# Patient Record
Sex: MALE | Race: OTHER | HISPANIC OR LATINO | URBAN - METROPOLITAN AREA
[De-identification: names, ages, dates, MRNs, and addresses within clinical notes are randomized per-mention and may not be internally consistent; named-entity substitution may affect disease eponyms.]

---

## 2018-08-11 ENCOUNTER — EMERGENCY (EMERGENCY)
Facility: HOSPITAL | Age: 48
LOS: 1 days | Discharge: HOME | End: 2018-08-11
Attending: EMERGENCY MEDICINE | Admitting: EMERGENCY MEDICINE
Payer: MEDICAID

## 2018-08-11 VITALS
DIASTOLIC BLOOD PRESSURE: 93 MMHG | HEART RATE: 63 BPM | RESPIRATION RATE: 16 BRPM | WEIGHT: 184.97 LBS | OXYGEN SATURATION: 98 % | TEMPERATURE: 98 F | SYSTOLIC BLOOD PRESSURE: 125 MMHG

## 2018-08-11 DIAGNOSIS — R10.9 UNSPECIFIED ABDOMINAL PAIN: ICD-10-CM

## 2018-08-11 LAB
ANION GAP SERPL CALC-SCNC: 5 MMOL/L — SIGNIFICANT CHANGE UP (ref 5–17)
APPEARANCE UR: CLEAR — SIGNIFICANT CHANGE UP
BASOPHILS # BLD AUTO: 0.1 K/UL — SIGNIFICANT CHANGE UP (ref 0–0.2)
BASOPHILS NFR BLD AUTO: 1.1 % — SIGNIFICANT CHANGE UP (ref 0–2)
BILIRUB UR-MCNC: NEGATIVE — SIGNIFICANT CHANGE UP
BUN SERPL-MCNC: 21 MG/DL — SIGNIFICANT CHANGE UP (ref 7–23)
CALCIUM SERPL-MCNC: 9.1 MG/DL — SIGNIFICANT CHANGE UP (ref 8.4–10.5)
CHLORIDE SERPL-SCNC: 105 MMOL/L — SIGNIFICANT CHANGE UP (ref 96–108)
CO2 SERPL-SCNC: 27 MMOL/L — SIGNIFICANT CHANGE UP (ref 22–31)
COLOR SPEC: YELLOW — SIGNIFICANT CHANGE UP
CREAT SERPL-MCNC: 1.18 MG/DL — SIGNIFICANT CHANGE UP (ref 0.5–1.3)
DIFF PNL FLD: NEGATIVE — SIGNIFICANT CHANGE UP
EOSINOPHIL # BLD AUTO: 0.1 K/UL — SIGNIFICANT CHANGE UP (ref 0–0.5)
EOSINOPHIL NFR BLD AUTO: 1.2 % — SIGNIFICANT CHANGE UP (ref 0–6)
GLUCOSE SERPL-MCNC: 99 MG/DL — SIGNIFICANT CHANGE UP (ref 70–99)
GLUCOSE UR QL: NEGATIVE — SIGNIFICANT CHANGE UP
HCT VFR BLD CALC: 48.1 % — SIGNIFICANT CHANGE UP (ref 39–50)
HGB BLD-MCNC: 16.2 G/DL — SIGNIFICANT CHANGE UP (ref 13–17)
KETONES UR-MCNC: NEGATIVE — SIGNIFICANT CHANGE UP
LEUKOCYTE ESTERASE UR-ACNC: NEGATIVE — SIGNIFICANT CHANGE UP
LYMPHOCYTES # BLD AUTO: 2.4 K/UL — SIGNIFICANT CHANGE UP (ref 1–3.3)
LYMPHOCYTES # BLD AUTO: 36.3 % — SIGNIFICANT CHANGE UP (ref 13–44)
MCHC RBC-ENTMCNC: 30.5 PG — SIGNIFICANT CHANGE UP (ref 27–34)
MCHC RBC-ENTMCNC: 33.8 GM/DL — SIGNIFICANT CHANGE UP (ref 32–36)
MCV RBC AUTO: 90.2 FL — SIGNIFICANT CHANGE UP (ref 80–100)
MONOCYTES # BLD AUTO: 0.5 K/UL — SIGNIFICANT CHANGE UP (ref 0–0.9)
MONOCYTES NFR BLD AUTO: 8.2 % — SIGNIFICANT CHANGE UP (ref 1–9)
NEUTROPHILS # BLD AUTO: 3.6 K/UL — SIGNIFICANT CHANGE UP (ref 1.8–7.4)
NEUTROPHILS NFR BLD AUTO: 53.2 % — SIGNIFICANT CHANGE UP (ref 43–77)
NITRITE UR-MCNC: NEGATIVE — SIGNIFICANT CHANGE UP
PH UR: 6 — SIGNIFICANT CHANGE UP (ref 5–8)
PLATELET # BLD AUTO: 162 K/UL — SIGNIFICANT CHANGE UP (ref 150–400)
POTASSIUM SERPL-MCNC: 4.2 MMOL/L — SIGNIFICANT CHANGE UP (ref 3.5–5.3)
POTASSIUM SERPL-SCNC: 4.2 MMOL/L — SIGNIFICANT CHANGE UP (ref 3.5–5.3)
PROT UR-MCNC: 15
RBC # BLD: 5.33 M/UL — SIGNIFICANT CHANGE UP (ref 4.2–5.8)
RBC # FLD: 11.7 % — SIGNIFICANT CHANGE UP (ref 10.3–14.5)
SODIUM SERPL-SCNC: 137 MMOL/L — SIGNIFICANT CHANGE UP (ref 135–145)
SP GR SPEC: 1.02 — SIGNIFICANT CHANGE UP (ref 1.01–1.02)
UROBILINOGEN FLD QL: NEGATIVE — SIGNIFICANT CHANGE UP
WBC # BLD: 6.7 K/UL — SIGNIFICANT CHANGE UP (ref 3.8–10.5)
WBC # FLD AUTO: 6.7 K/UL — SIGNIFICANT CHANGE UP (ref 3.8–10.5)

## 2018-08-11 PROCEDURE — 99284 EMERGENCY DEPT VISIT MOD MDM: CPT | Mod: 25

## 2018-08-11 NOTE — ED ADULT NURSE NOTE - NSIMPLEMENTINTERV_GEN_ALL_ED
Implemented All Universal Safety Interventions:  [place] to call system. Call bell, personal items and telephone within reach. Instruct patient to call for assistance. Room bathroom lighting operational. Non-slip footwear when patient is off stretcher. Physically safe environment: no spills, clutter or unnecessary equipment. Stretcher in lowest position, wheels locked, appropriate side rails in place.

## 2018-08-12 VITALS
OXYGEN SATURATION: 97 % | SYSTOLIC BLOOD PRESSURE: 112 MMHG | HEART RATE: 72 BPM | RESPIRATION RATE: 16 BRPM | DIASTOLIC BLOOD PRESSURE: 75 MMHG

## 2018-08-12 LAB — COMMENT - URINE: SIGNIFICANT CHANGE UP

## 2018-08-12 PROCEDURE — 74176 CT ABD & PELVIS W/O CONTRAST: CPT | Mod: 26

## 2018-08-12 PROCEDURE — 80048 BASIC METABOLIC PNL TOTAL CA: CPT

## 2018-08-12 PROCEDURE — 74176 CT ABD & PELVIS W/O CONTRAST: CPT

## 2018-08-12 PROCEDURE — 87086 URINE CULTURE/COLONY COUNT: CPT

## 2018-08-12 PROCEDURE — 85027 COMPLETE CBC AUTOMATED: CPT

## 2018-08-12 PROCEDURE — 87591 N.GONORRHOEAE DNA AMP PROB: CPT

## 2018-08-12 PROCEDURE — 87491 CHLMYD TRACH DNA AMP PROBE: CPT

## 2018-08-12 PROCEDURE — 99284 EMERGENCY DEPT VISIT MOD MDM: CPT

## 2018-08-12 PROCEDURE — 81001 URINALYSIS AUTO W/SCOPE: CPT

## 2018-08-12 RX ORDER — CIPROFLOXACIN LACTATE 400MG/40ML
1 VIAL (ML) INTRAVENOUS
Qty: 7 | Refills: 0
Start: 2018-08-12 | End: 2018-08-18

## 2018-08-12 NOTE — ED PROVIDER NOTE - OBJECTIVE STATEMENT
47 year old M complaining of flank pain with urinary frequency and malaise.  Pt denies fever or chills.

## 2018-08-12 NOTE — ED PROVIDER NOTE - MEDICAL DECISION MAKING DETAILS
47 year old M with rt flank pain and urinary frequency, pt has a tender prostrate and will be started on levaquin

## 2018-08-13 LAB
C TRACH RRNA SPEC QL NAA+PROBE: SIGNIFICANT CHANGE UP
CULTURE RESULTS: SIGNIFICANT CHANGE UP
N GONORRHOEA RRNA SPEC QL NAA+PROBE: SIGNIFICANT CHANGE UP
SPECIMEN SOURCE: SIGNIFICANT CHANGE UP
SPECIMEN SOURCE: SIGNIFICANT CHANGE UP

## 2019-07-26 ENCOUNTER — EMERGENCY (EMERGENCY)
Facility: HOSPITAL | Age: 49
LOS: 1 days | Discharge: HOME | End: 2019-07-26
Attending: INTERNAL MEDICINE | Admitting: INTERNAL MEDICINE
Payer: COMMERCIAL

## 2019-07-26 VITALS
DIASTOLIC BLOOD PRESSURE: 76 MMHG | OXYGEN SATURATION: 100 % | WEIGHT: 203.49 LBS | TEMPERATURE: 101 F | HEIGHT: 69 IN | HEART RATE: 98 BPM | RESPIRATION RATE: 18 BRPM | SYSTOLIC BLOOD PRESSURE: 116 MMHG

## 2019-07-26 VITALS
RESPIRATION RATE: 17 BRPM | TEMPERATURE: 100 F | SYSTOLIC BLOOD PRESSURE: 123 MMHG | DIASTOLIC BLOOD PRESSURE: 78 MMHG | OXYGEN SATURATION: 99 % | HEART RATE: 91 BPM

## 2019-07-26 DIAGNOSIS — R50.9 FEVER, UNSPECIFIED: ICD-10-CM

## 2019-07-26 LAB
ALBUMIN SERPL ELPH-MCNC: 4.1 G/DL — SIGNIFICANT CHANGE UP (ref 3.3–5)
ALP SERPL-CCNC: 159 U/L — HIGH (ref 40–120)
ALT FLD-CCNC: 108 U/L DA — HIGH (ref 10–45)
ANION GAP SERPL CALC-SCNC: 13 MMOL/L — SIGNIFICANT CHANGE UP (ref 5–17)
APPEARANCE UR: CLEAR — SIGNIFICANT CHANGE UP
APTT BLD: 29.8 SEC — SIGNIFICANT CHANGE UP (ref 27.5–36.3)
AST SERPL-CCNC: 63 U/L — HIGH (ref 10–40)
BASOPHILS # BLD AUTO: 0.02 K/UL — SIGNIFICANT CHANGE UP (ref 0–0.2)
BASOPHILS NFR BLD AUTO: 0.2 % — SIGNIFICANT CHANGE UP (ref 0–2)
BILIRUB SERPL-MCNC: 1.3 MG/DL — HIGH (ref 0.2–1.2)
BILIRUB UR-MCNC: NEGATIVE — SIGNIFICANT CHANGE UP
BUN SERPL-MCNC: 15 MG/DL — SIGNIFICANT CHANGE UP (ref 7–23)
CALCIUM SERPL-MCNC: 9.1 MG/DL — SIGNIFICANT CHANGE UP (ref 8.4–10.5)
CHLORIDE SERPL-SCNC: 103 MMOL/L — SIGNIFICANT CHANGE UP (ref 96–108)
CO2 SERPL-SCNC: 23 MMOL/L — SIGNIFICANT CHANGE UP (ref 22–31)
COLOR SPEC: YELLOW — SIGNIFICANT CHANGE UP
CREAT SERPL-MCNC: 1.27 MG/DL — SIGNIFICANT CHANGE UP (ref 0.5–1.3)
DIFF PNL FLD: NEGATIVE — SIGNIFICANT CHANGE UP
EOSINOPHIL # BLD AUTO: 0.01 K/UL — SIGNIFICANT CHANGE UP (ref 0–0.5)
EOSINOPHIL NFR BLD AUTO: 0.1 % — SIGNIFICANT CHANGE UP (ref 0–6)
GLUCOSE SERPL-MCNC: 130 MG/DL — HIGH (ref 70–99)
GLUCOSE UR QL: NEGATIVE — SIGNIFICANT CHANGE UP
HCT VFR BLD CALC: 44.1 % — SIGNIFICANT CHANGE UP (ref 39–50)
HGB BLD-MCNC: 15.1 G/DL — SIGNIFICANT CHANGE UP (ref 13–17)
IMM GRANULOCYTES NFR BLD AUTO: 0.5 % — SIGNIFICANT CHANGE UP (ref 0–1.5)
INR BLD: 1.19 RATIO — HIGH (ref 0.88–1.16)
KETONES UR-MCNC: NEGATIVE — SIGNIFICANT CHANGE UP
LACTATE SERPL-SCNC: 1.8 MMOL/L — SIGNIFICANT CHANGE UP (ref 0.7–2)
LEUKOCYTE ESTERASE UR-ACNC: NEGATIVE — SIGNIFICANT CHANGE UP
LYMPHOCYTES # BLD AUTO: 0.57 K/UL — LOW (ref 1–3.3)
LYMPHOCYTES # BLD AUTO: 5 % — LOW (ref 13–44)
MCHC RBC-ENTMCNC: 29.8 PG — SIGNIFICANT CHANGE UP (ref 27–34)
MCHC RBC-ENTMCNC: 34.2 GM/DL — SIGNIFICANT CHANGE UP (ref 32–36)
MCV RBC AUTO: 87.2 FL — SIGNIFICANT CHANGE UP (ref 80–100)
MONOCYTES # BLD AUTO: 0.61 K/UL — SIGNIFICANT CHANGE UP (ref 0–0.9)
MONOCYTES NFR BLD AUTO: 5.4 % — SIGNIFICANT CHANGE UP (ref 2–14)
NEUTROPHILS # BLD AUTO: 10.03 K/UL — HIGH (ref 1.8–7.4)
NEUTROPHILS NFR BLD AUTO: 88.8 % — HIGH (ref 43–77)
NITRITE UR-MCNC: NEGATIVE — SIGNIFICANT CHANGE UP
NRBC # BLD: 0 /100 WBCS — SIGNIFICANT CHANGE UP (ref 0–0)
PH UR: 7 — SIGNIFICANT CHANGE UP (ref 5–8)
PLATELET # BLD AUTO: 135 K/UL — LOW (ref 150–400)
POTASSIUM SERPL-MCNC: 3.7 MMOL/L — SIGNIFICANT CHANGE UP (ref 3.5–5.3)
POTASSIUM SERPL-SCNC: 3.7 MMOL/L — SIGNIFICANT CHANGE UP (ref 3.5–5.3)
PROCALCITONIN SERPL-MCNC: 0.2 NG/ML — HIGH
PROT SERPL-MCNC: 7.6 G/DL — SIGNIFICANT CHANGE UP (ref 6–8.3)
PROT UR-MCNC: NEGATIVE — SIGNIFICANT CHANGE UP
PROTHROM AB SERPL-ACNC: 13.4 SEC — HIGH (ref 10–12.9)
RBC # BLD: 5.06 M/UL — SIGNIFICANT CHANGE UP (ref 4.2–5.8)
RBC # FLD: 12.9 % — SIGNIFICANT CHANGE UP (ref 10.3–14.5)
SODIUM SERPL-SCNC: 139 MMOL/L — SIGNIFICANT CHANGE UP (ref 135–145)
SP GR SPEC: 1 — LOW (ref 1.01–1.02)
UROBILINOGEN FLD QL: NEGATIVE — SIGNIFICANT CHANGE UP
WBC # BLD: 11.3 K/UL — HIGH (ref 3.8–10.5)
WBC # FLD AUTO: 11.3 K/UL — HIGH (ref 3.8–10.5)

## 2019-07-26 PROCEDURE — 99284 EMERGENCY DEPT VISIT MOD MDM: CPT

## 2019-07-26 PROCEDURE — 85610 PROTHROMBIN TIME: CPT

## 2019-07-26 PROCEDURE — 96365 THER/PROPH/DIAG IV INF INIT: CPT | Mod: XU

## 2019-07-26 PROCEDURE — 85730 THROMBOPLASTIN TIME PARTIAL: CPT

## 2019-07-26 PROCEDURE — 87040 BLOOD CULTURE FOR BACTERIA: CPT

## 2019-07-26 PROCEDURE — 93005 ELECTROCARDIOGRAM TRACING: CPT

## 2019-07-26 PROCEDURE — 36415 COLL VENOUS BLD VENIPUNCTURE: CPT

## 2019-07-26 PROCEDURE — 74177 CT ABD & PELVIS W/CONTRAST: CPT | Mod: 26

## 2019-07-26 PROCEDURE — 71045 X-RAY EXAM CHEST 1 VIEW: CPT | Mod: 26

## 2019-07-26 PROCEDURE — 93010 ELECTROCARDIOGRAM REPORT: CPT

## 2019-07-26 PROCEDURE — 87086 URINE CULTURE/COLONY COUNT: CPT

## 2019-07-26 PROCEDURE — 71045 X-RAY EXAM CHEST 1 VIEW: CPT

## 2019-07-26 PROCEDURE — 96361 HYDRATE IV INFUSION ADD-ON: CPT

## 2019-07-26 PROCEDURE — 83605 ASSAY OF LACTIC ACID: CPT

## 2019-07-26 PROCEDURE — 85027 COMPLETE CBC AUTOMATED: CPT

## 2019-07-26 PROCEDURE — 99284 EMERGENCY DEPT VISIT MOD MDM: CPT | Mod: 25

## 2019-07-26 PROCEDURE — 84145 PROCALCITONIN (PCT): CPT

## 2019-07-26 PROCEDURE — 74177 CT ABD & PELVIS W/CONTRAST: CPT

## 2019-07-26 PROCEDURE — 80053 COMPREHEN METABOLIC PANEL: CPT

## 2019-07-26 RX ORDER — SODIUM CHLORIDE 9 MG/ML
2200 INJECTION INTRAMUSCULAR; INTRAVENOUS; SUBCUTANEOUS ONCE
Refills: 0 | Status: COMPLETED | OUTPATIENT
Start: 2019-07-26 | End: 2019-07-26

## 2019-07-26 RX ORDER — IBUPROFEN 200 MG
1 TABLET ORAL
Qty: 30 | Refills: 0
Start: 2019-07-26

## 2019-07-26 RX ORDER — CEFTRIAXONE 500 MG/1
1000 INJECTION, POWDER, FOR SOLUTION INTRAMUSCULAR; INTRAVENOUS ONCE
Refills: 0 | Status: COMPLETED | OUTPATIENT
Start: 2019-07-26 | End: 2019-07-26

## 2019-07-26 RX ORDER — IOHEXOL 300 MG/ML
30 INJECTION, SOLUTION INTRAVENOUS ONCE
Refills: 0 | Status: COMPLETED | OUTPATIENT
Start: 2019-07-26 | End: 2019-07-26

## 2019-07-26 RX ORDER — ACETAMINOPHEN 500 MG
650 TABLET ORAL ONCE
Refills: 0 | Status: COMPLETED | OUTPATIENT
Start: 2019-07-26 | End: 2019-07-26

## 2019-07-26 RX ADMIN — Medication 650 MILLIGRAM(S): at 18:20

## 2019-07-26 RX ADMIN — CEFTRIAXONE 1000 MILLIGRAM(S): 500 INJECTION, POWDER, FOR SOLUTION INTRAMUSCULAR; INTRAVENOUS at 18:10

## 2019-07-26 RX ADMIN — Medication 650 MILLIGRAM(S): at 17:20

## 2019-07-26 RX ADMIN — CEFTRIAXONE 100 MILLIGRAM(S): 500 INJECTION, POWDER, FOR SOLUTION INTRAMUSCULAR; INTRAVENOUS at 17:15

## 2019-07-26 RX ADMIN — IOHEXOL 30 MILLILITER(S): 300 INJECTION, SOLUTION INTRAVENOUS at 18:29

## 2019-07-26 RX ADMIN — SODIUM CHLORIDE 2200 MILLILITER(S): 9 INJECTION INTRAMUSCULAR; INTRAVENOUS; SUBCUTANEOUS at 19:20

## 2019-07-26 RX ADMIN — SODIUM CHLORIDE 2200 MILLILITER(S): 9 INJECTION INTRAMUSCULAR; INTRAVENOUS; SUBCUTANEOUS at 17:14

## 2019-07-26 NOTE — ED PROVIDER NOTE - ATTENDING CONTRIBUTION TO CARE
cc fever 1 day abd pain   pe warm to touch nad , non toxic  abd soft LLQ tenderness   chest cta bilaterally   Dr. Baez:  I have reviewed and discussed with the PA/ resident the case specifics, including the history, physical assessment, evaluation, conclusion, laboratory results, and medical plan. I agree with the contents, and conclusions. I have personally examined, and interviewed the patient.

## 2019-07-26 NOTE — ED PROVIDER NOTE - CROS ED ROS STATEMENT
"Pended meds. 9/19 OV says: She reports that she stopped omeprazole 40mg for one week or two and instead taking zantac during this time and reports that she was \"miserable\" and restarted omeprazole at half the dose at 20mg and is providing relief to symptoms. She has never completed EGD or endoscopy.   High cholesterol- advised patient to continue to take mevacor and begin fish oil. We will plan to repeat Fasting labs in 6 months. Cholesterol was 214.     Sent MyChart.  Milka Porter RN     " all other ROS negative except as per HPI

## 2019-07-26 NOTE — ED PROVIDER NOTE - OBJECTIVE STATEMENT
pt 47 yo m c/o 1 day of fever, chills, shaking, dull frontal headache. took tylenol last night and aleeve this AM with no relief

## 2019-07-26 NOTE — ED PROVIDER NOTE - MUSCULOSKELETAL, MLM
neck: FROM, no meningismus. Spine appears normal, range of motion is not limited, no muscle or joint tenderness

## 2019-07-26 NOTE — ED PROVIDER NOTE - CARE PROVIDER_API CALL
Buck Hill (MD)  Gastroenterology; Internal Medicine  [address] [place] [address]  Phone: [phone]  Fax: [phone]  Follow Up Time:

## 2019-07-26 NOTE — ED PROVIDER NOTE - CLINICAL SUMMARY MEDICAL DECISION MAKING FREE TEXT BOX
pt 49 yo m c/o 1 day of fever, chills, shaking, dull frontal headache. took tylenol last night and aleeve this AM with no relief. fever and headache resolved. Discussed with patient need to return to ED if symptoms don't continue to improve or recur or develops any new or worsening symptoms that are of concern.

## 2019-07-28 LAB
CULTURE RESULTS: NO GROWTH — SIGNIFICANT CHANGE UP
SPECIMEN SOURCE: SIGNIFICANT CHANGE UP

## 2019-07-31 LAB
CULTURE RESULTS: SIGNIFICANT CHANGE UP
CULTURE RESULTS: SIGNIFICANT CHANGE UP
SPECIMEN SOURCE: SIGNIFICANT CHANGE UP
SPECIMEN SOURCE: SIGNIFICANT CHANGE UP

## 2020-11-11 ENCOUNTER — APPOINTMENT (OUTPATIENT)
Dept: FAMILY MEDICINE | Facility: CLINIC | Age: 50
End: 2020-11-11

## 2021-01-26 ENCOUNTER — NON-APPOINTMENT (OUTPATIENT)
Age: 51
End: 2021-01-26

## 2021-01-26 ENCOUNTER — APPOINTMENT (OUTPATIENT)
Age: 51
End: 2021-01-26
Payer: MEDICAID

## 2021-01-26 VITALS
OXYGEN SATURATION: 98 % | HEART RATE: 69 BPM | RESPIRATION RATE: 15 BRPM | DIASTOLIC BLOOD PRESSURE: 66 MMHG | TEMPERATURE: 97.6 F | WEIGHT: 202 LBS | HEIGHT: 69 IN | SYSTOLIC BLOOD PRESSURE: 100 MMHG | BODY MASS INDEX: 29.92 KG/M2

## 2021-01-26 DIAGNOSIS — R53.83 OTHER FATIGUE: ICD-10-CM

## 2021-01-26 DIAGNOSIS — R94.31 ABNORMAL ELECTROCARDIOGRAM [ECG] [EKG]: ICD-10-CM

## 2021-01-26 DIAGNOSIS — U07.1 COVID-19: ICD-10-CM

## 2021-01-26 DIAGNOSIS — Z23 ENCOUNTER FOR IMMUNIZATION: ICD-10-CM

## 2021-01-26 PROCEDURE — 96372 THER/PROPH/DIAG INJ SC/IM: CPT

## 2021-01-26 PROCEDURE — 93000 ELECTROCARDIOGRAM COMPLETE: CPT

## 2021-01-26 PROCEDURE — 99072 ADDL SUPL MATRL&STAF TM PHE: CPT

## 2021-01-26 PROCEDURE — 99205 OFFICE O/P NEW HI 60 MIN: CPT | Mod: 25

## 2021-01-26 RX ORDER — CYANOCOBALAMIN 1000 UG/ML
1000 INJECTION INTRAMUSCULAR; SUBCUTANEOUS
Qty: 0 | Refills: 0 | Status: COMPLETED | OUTPATIENT
Start: 2021-01-26

## 2021-01-26 NOTE — HEALTH RISK ASSESSMENT
[No] : In the past 12 months have you used drugs other than those required for medical reasons? No [No falls in past year] : Patient reported no falls in the past year [0] : 2) Feeling down, depressed, or hopeless: Not at all (0) [] : No [de-identified] : regular [[id number]] : 0

## 2021-01-26 NOTE — REVIEW OF SYSTEMS
[Negative] : Heme/Lymph [Fatigue] : fatigue [Fever] : no fever [Chills] : no chills [Hot Flashes] : no hot flashes [Night Sweats] : no night sweats

## 2021-01-26 NOTE — PLAN
[FreeTextEntry1] : MVI. weight loss. \par strengthening exercises. \par check labs.\par  B 12 injection . \par  hydration.

## 2021-01-26 NOTE — HISTORY OF PRESENT ILLNESS
[FreeTextEntry8] : Here to establish. He feels tired. He had Covid symptoms( fever,headache,  cough and SOB ) on 1/4/2021 had Covid  positive test on 1/7/2021 and had negative test on Friday at [facility] . Close sick contacts with Covid. Here with wife.Patient is a . \par He is overweight and sedentary.

## 2021-06-24 NOTE — ED PROVIDER NOTE - DIAGNOSIS COUNSELING, MDM
[Time Spent: ___ minutes] : I have spent [unfilled] minutes of time on the encounter. [>50% of the face to face encounter time was spent on counseling and/or coordination of care for ___] : Greater than 50% of the face to face encounter time was spent on counseling and/or coordination of care for [unfilled] conducted a detailed discussion... I had a detailed discussion with the patient and/or guardian regarding the historical points, exam findings, and any diagnostic results supporting the discharge/admit diagnosis.

## 2022-03-10 ENCOUNTER — APPOINTMENT (OUTPATIENT)
Dept: FAMILY MEDICINE | Facility: CLINIC | Age: 52
End: 2022-03-10
Payer: MEDICAID

## 2022-03-10 ENCOUNTER — NON-APPOINTMENT (OUTPATIENT)
Age: 52
End: 2022-03-10

## 2022-03-10 VITALS
RESPIRATION RATE: 18 BRPM | SYSTOLIC BLOOD PRESSURE: 110 MMHG | HEART RATE: 75 BPM | HEIGHT: 69 IN | WEIGHT: 210 LBS | DIASTOLIC BLOOD PRESSURE: 80 MMHG | OXYGEN SATURATION: 97 % | TEMPERATURE: 97.5 F | BODY MASS INDEX: 31.1 KG/M2

## 2022-03-10 DIAGNOSIS — Z12.11 ENCOUNTER FOR SCREENING FOR MALIGNANT NEOPLASM OF COLON: ICD-10-CM

## 2022-03-10 PROCEDURE — 99396 PREV VISIT EST AGE 40-64: CPT | Mod: 25

## 2022-03-10 PROCEDURE — 93000 ELECTROCARDIOGRAM COMPLETE: CPT

## 2022-03-11 DIAGNOSIS — R79.89 OTHER SPECIFIED ABNORMAL FINDINGS OF BLOOD CHEMISTRY: ICD-10-CM

## 2022-03-11 LAB
25(OH)D3 SERPL-MCNC: 17.6 NG/ML
ALBUMIN SERPL ELPH-MCNC: 5 G/DL
ALP BLD-CCNC: 143 U/L
ALT SERPL-CCNC: 107 U/L
ANION GAP SERPL CALC-SCNC: 15 MMOL/L
APPEARANCE: CLEAR
AST SERPL-CCNC: 53 U/L
BACTERIA: NEGATIVE
BASOPHILS # BLD AUTO: 0.05 K/UL
BASOPHILS NFR BLD AUTO: 0.9 %
BILIRUB SERPL-MCNC: 0.7 MG/DL
BILIRUBIN URINE: NEGATIVE
BLOOD URINE: NEGATIVE
BUN SERPL-MCNC: 18 MG/DL
CALCIUM SERPL-MCNC: 9.5 MG/DL
CHLORIDE SERPL-SCNC: 106 MMOL/L
CHOLEST SERPL-MCNC: 206 MG/DL
CO2 SERPL-SCNC: 20 MMOL/L
COLOR: YELLOW
CREAT SERPL-MCNC: 0.95 MG/DL
EGFR: 97 ML/MIN/1.73M2
EOSINOPHIL # BLD AUTO: 0.09 K/UL
EOSINOPHIL NFR BLD AUTO: 1.6 %
ESTIMATED AVERAGE GLUCOSE: 120 MG/DL
GLUCOSE QUALITATIVE U: NEGATIVE
GLUCOSE SERPL-MCNC: 115 MG/DL
HBA1C MFR BLD HPLC: 5.8 %
HCT VFR BLD CALC: 49.7 %
HDLC SERPL-MCNC: 43 MG/DL
HGB BLD-MCNC: 16.7 G/DL
HYALINE CASTS: 0 /LPF
IMM GRANULOCYTES NFR BLD AUTO: 0.2 %
KETONES URINE: NEGATIVE
LDLC SERPL CALC-MCNC: 120 MG/DL
LEUKOCYTE ESTERASE URINE: NEGATIVE
LYMPHOCYTES # BLD AUTO: 1.92 K/UL
LYMPHOCYTES NFR BLD AUTO: 33.9 %
MAN DIFF?: NORMAL
MCHC RBC-ENTMCNC: 29.8 PG
MCHC RBC-ENTMCNC: 33.6 GM/DL
MCV RBC AUTO: 88.6 FL
MICROSCOPIC-UA: NORMAL
MONOCYTES # BLD AUTO: 0.53 K/UL
MONOCYTES NFR BLD AUTO: 9.3 %
NEUTROPHILS # BLD AUTO: 3.07 K/UL
NEUTROPHILS NFR BLD AUTO: 54.1 %
NITRITE URINE: NEGATIVE
NONHDLC SERPL-MCNC: 163 MG/DL
PH URINE: 6
PLATELET # BLD AUTO: 164 K/UL
POTASSIUM SERPL-SCNC: 4.4 MMOL/L
PROT SERPL-MCNC: 8.1 G/DL
PROTEIN URINE: NEGATIVE
PSA SERPL-MCNC: 2.23 NG/ML
RBC # BLD: 5.61 M/UL
RBC # FLD: 13.1 %
RED BLOOD CELLS URINE: 0 /HPF
SODIUM SERPL-SCNC: 140 MMOL/L
SPECIFIC GRAVITY URINE: 1.02
SQUAMOUS EPITHELIAL CELLS: 0 /HPF
TRIGL SERPL-MCNC: 215 MG/DL
TSH SERPL-ACNC: 2.79 UIU/ML
UROBILINOGEN URINE: NORMAL
WBC # FLD AUTO: 5.67 K/UL
WHITE BLOOD CELLS URINE: 0 /HPF

## 2022-03-13 NOTE — PLAN
[FreeTextEntry1] : weight loss, healthy diet and exercise.\par  labs . Cardiology and neurology referral.

## 2022-03-13 NOTE — HEALTH RISK ASSESSMENT
[Good] : ~his/her~ current health as good [Never] : Never [No] : In the past 12 months have you used drugs other than those required for medical reasons? No [No falls in past year] : Patient reported no falls in the past year [0] : 2) Feeling down, depressed, or hopeless: Not at all (0) [PHQ-2 Negative - No further assessment needed] : PHQ-2 Negative - No further assessment needed [de-identified] : regular [[id number]] : 0 [No Retinopathy] : No retinopathy [HIV test declined] : HIV test declined [Hepatitis C test declined] : Hepatitis C test declined [Change in mental status noted] : No change in mental status noted [Language] : denies difficulty with language [Handling Complex Tasks] : denies difficulty handling complex tasks [None] : None [With Family] : lives with family [Employed] : employed [] :  [Sexually Active] : sexually active [Feels Safe at Home] : Feels safe at home [Fully functional (bathing, dressing, toileting, transferring, walking, feeding)] : Fully functional (bathing, dressing, toileting, transferring, walking, feeding) [Fully functional (using the telephone, shopping, preparing meals, housekeeping, doing laundry, using] : Fully functional and needs no help or supervision to perform IADLs (using the telephone, shopping, preparing meals, housekeeping, doing laundry, using transportation, managing medications and managing finances) [Independent] : managing finances [Reports changes in hearing] : Reports no changes in hearing [Reports changes in vision] : Reports no changes in vision [Reports normal functional visual acuity (ie: able to read med bottle)] : Reports normal functional visual acuity [Reports changes in dental health] : Reports no changes in dental health [Smoke Detector] : smoke detector [Carbon Monoxide Detector] : carbon monoxide detector [Seat Belt] :  uses seat belt [Sunscreen] : uses sunscreen [ColonoscopyComments] : ordered [With Patient/Caregiver] : , with patient/caregiver [AdvancecareDate] : 03/22

## 2022-03-13 NOTE — PHYSICAL EXAM
[No Acute Distress] : no acute distress [Well Nourished] : well nourished [Well-Appearing] : well-appearing [Well Developed] : well developed [Normal Sclera/Conjunctiva] : normal sclera/conjunctiva [PERRL] : pupils equal round and reactive to light [EOMI] : extraocular movements intact [Normal Outer Ear/Nose] : the outer ears and nose were normal in appearance [Normal Oropharynx] : the oropharynx was normal [No JVD] : no jugular venous distention [No Lymphadenopathy] : no lymphadenopathy [Supple] : supple [Thyroid Normal, No Nodules] : the thyroid was normal and there were no nodules present [No Respiratory Distress] : no respiratory distress  [No Accessory Muscle Use] : no accessory muscle use [Clear to Auscultation] : lungs were clear to auscultation bilaterally [Normal Rate] : normal rate  [Regular Rhythm] : with a regular rhythm [Normal S1, S2] : normal S1 and S2 [No Murmur] : no murmur heard [No Carotid Bruits] : no carotid bruits [No Abdominal Bruit] : a ~M bruit was not heard ~T in the abdomen [No Varicosities] : no varicosities [Pedal Pulses Present] : the pedal pulses are present [No Edema] : there was no peripheral edema [No Palpable Aorta] : no palpable aorta [No Extremity Clubbing/Cyanosis] : no extremity clubbing/cyanosis [Soft] : abdomen soft [Non Tender] : non-tender [Non-distended] : non-distended [No Masses] : no abdominal mass palpated [No HSM] : no HSM [Normal Bowel Sounds] : normal bowel sounds [Normal Posterior Cervical Nodes] : no posterior cervical lymphadenopathy [Normal Anterior Cervical Nodes] : no anterior cervical lymphadenopathy [No CVA Tenderness] : no CVA  tenderness [No Spinal Tenderness] : no spinal tenderness [No Joint Swelling] : no joint swelling [Grossly Normal Strength/Tone] : grossly normal strength/tone [No Rash] : no rash [Coordination Grossly Intact] : coordination grossly intact [No Focal Deficits] : no focal deficits [Normal Gait] : normal gait [Deep Tendon Reflexes (DTR)] : deep tendon reflexes were 2+ and symmetric [Normal Affect] : the affect was normal [Normal Insight/Judgement] : insight and judgment were intact [de-identified] : defer to urology

## 2022-03-15 LAB
GGT SERPL-CCNC: 647 U/L
HAV IGM SER QL: NONREACTIVE
HBV CORE IGM SER QL: NONREACTIVE
HBV SURFACE AG SER QL: NONREACTIVE
HCV AB SER QL: NONREACTIVE
HCV S/CO RATIO: 0.14 S/CO

## 2022-03-18 ENCOUNTER — OUTPATIENT (OUTPATIENT)
Dept: OUTPATIENT SERVICES | Facility: HOSPITAL | Age: 52
LOS: 1 days | End: 2022-03-18
Payer: MEDICAID

## 2022-03-18 ENCOUNTER — APPOINTMENT (OUTPATIENT)
Dept: ULTRASOUND IMAGING | Facility: CLINIC | Age: 52
End: 2022-03-18
Payer: MEDICAID

## 2022-03-18 DIAGNOSIS — R79.89 OTHER SPECIFIED ABNORMAL FINDINGS OF BLOOD CHEMISTRY: ICD-10-CM

## 2022-03-18 PROCEDURE — 76700 US EXAM ABDOM COMPLETE: CPT

## 2022-03-18 PROCEDURE — 76700 US EXAM ABDOM COMPLETE: CPT | Mod: 26

## 2022-10-18 ENCOUNTER — APPOINTMENT (OUTPATIENT)
Dept: FAMILY MEDICINE | Facility: CLINIC | Age: 52
End: 2022-10-18

## 2022-10-18 VITALS
TEMPERATURE: 97.9 F | SYSTOLIC BLOOD PRESSURE: 100 MMHG | DIASTOLIC BLOOD PRESSURE: 74 MMHG | HEART RATE: 76 BPM | HEIGHT: 70 IN | BODY MASS INDEX: 30.35 KG/M2 | WEIGHT: 212 LBS | OXYGEN SATURATION: 97 %

## 2022-10-18 DIAGNOSIS — H61.20 IMPACTED CERUMEN, UNSPECIFIED EAR: ICD-10-CM

## 2022-10-18 DIAGNOSIS — R22.30 LOCALIZED SWELLING, MASS AND LUMP, UNSPECIFIED UPPER LIMB: ICD-10-CM

## 2022-10-18 DIAGNOSIS — R35.0 FREQUENCY OF MICTURITION: ICD-10-CM

## 2022-10-18 PROCEDURE — G0008: CPT

## 2022-10-18 PROCEDURE — 99214 OFFICE O/P EST MOD 30 MIN: CPT | Mod: 25

## 2022-10-18 PROCEDURE — 90686 IIV4 VACC NO PRSV 0.5 ML IM: CPT

## 2022-10-18 NOTE — HISTORY OF PRESENT ILLNESS
[de-identified] : Notes a lump on right upper shoulder. Noted initially 3 months ago. \par INcreasing on size. \par Described as " a bother." NO pain noted. \par NO trauma.\par Has also noted persistent fatigue.\par At times notes numbness shooting down right arm.  No limited range of motion of right shoulder.\par \par Remote history of coronary artery disease requiring stent per patient.  He noted this occurred about 9 years ago at [facility], but has not followed up with cardiology.  Is not on statin or aspirin.  Denies any chest pain or shortness of breath at visit. \par \par Wife is also requesting referral for urology.  He voids frequently which has been chronic for patient.  Wife says he was told he had enlarged testicles in the past and was referred to urology but did not follow-up.\par \par

## 2022-10-18 NOTE — PHYSICAL EXAM
[Normal] : no rash [de-identified] : Bilateral cerumen impaction-hyperpigmented edema- [de-identified] : Soft, nontender, nonfluctuant, mobile mass noted just proximal to right acromioclavicular joint.  Patient reports numbness sensation with palpation of mass.

## 2022-10-31 ENCOUNTER — APPOINTMENT (OUTPATIENT)
Dept: OTOLARYNGOLOGY | Facility: CLINIC | Age: 52
End: 2022-10-31

## 2022-10-31 ENCOUNTER — NON-APPOINTMENT (OUTPATIENT)
Age: 52
End: 2022-10-31

## 2022-10-31 VITALS
BODY MASS INDEX: 30.35 KG/M2 | HEIGHT: 70 IN | TEMPERATURE: 98 F | SYSTOLIC BLOOD PRESSURE: 121 MMHG | DIASTOLIC BLOOD PRESSURE: 72 MMHG | HEART RATE: 66 BPM | WEIGHT: 212 LBS | OXYGEN SATURATION: 96 %

## 2022-10-31 PROCEDURE — G0268 REMOVAL OF IMPACTED WAX MD: CPT

## 2022-10-31 PROCEDURE — 99203 OFFICE O/P NEW LOW 30 MIN: CPT | Mod: 25

## 2022-10-31 NOTE — ASSESSMENT
[FreeTextEntry1] : 51 year old male with bilateral clogged ears now s/p cerumen removal. Improvement. Return as needed. Dc qtip use.

## 2022-10-31 NOTE — PHYSICAL EXAM
[de-identified] : cerumen removal bilaterally, tm intact [Midline] : trachea located in midline position [Normal] : no rashes

## 2022-10-31 NOTE — PROCEDURE
[FreeTextEntry3] : Procedure: Removal of bilateral cerumen with microscopy assistance\par \par Pre-operative diagnosis: Ear pain and hearing loss\par \par Details:\par A speculum was placed into the right external auditory canal and the ear canal and tympanic membrane was viewed under otomicroscopy. Cerumen was present within the canal. This was removed using suction and ear curette. The tympanic membrane was viewed intact. There was no evidence of middle ear effusion. An identical procedure was performed on the left side. The tympanic membrane was intact. There was no evidence of middle ear effusion.\par \par Post-operative diagnosis: bilateral cerumen\par

## 2022-10-31 NOTE — HISTORY OF PRESENT ILLNESS
[de-identified] : 51 year old male with bilateral clogged ears - uses qtips regularly. Muffled hearing. denies otorrhea, otalgia, tinnitus.

## 2022-11-01 ENCOUNTER — INPATIENT (INPATIENT)
Facility: HOSPITAL | Age: 52
LOS: 0 days | Discharge: HOME | DRG: 313 | End: 2022-11-02
Attending: FAMILY MEDICINE | Admitting: FAMILY MEDICINE
Payer: MEDICAID

## 2022-11-01 VITALS
OXYGEN SATURATION: 97 % | WEIGHT: 208.56 LBS | HEART RATE: 63 BPM | RESPIRATION RATE: 18 BRPM | SYSTOLIC BLOOD PRESSURE: 116 MMHG | HEIGHT: 70 IN | TEMPERATURE: 99 F | DIASTOLIC BLOOD PRESSURE: 75 MMHG

## 2022-11-01 DIAGNOSIS — R07.9 CHEST PAIN, UNSPECIFIED: ICD-10-CM

## 2022-11-01 LAB
ALBUMIN SERPL ELPH-MCNC: 4 G/DL — SIGNIFICANT CHANGE UP (ref 3.3–5)
ALP SERPL-CCNC: 125 U/L — HIGH (ref 40–120)
ALT FLD-CCNC: 98 U/L — HIGH (ref 10–45)
ANION GAP SERPL CALC-SCNC: 11 MMOL/L — SIGNIFICANT CHANGE UP (ref 5–17)
AST SERPL-CCNC: 44 U/L — HIGH (ref 10–40)
BASOPHILS # BLD AUTO: 0.04 K/UL — SIGNIFICANT CHANGE UP (ref 0–0.2)
BASOPHILS NFR BLD AUTO: 0.8 % — SIGNIFICANT CHANGE UP (ref 0–2)
BILIRUB SERPL-MCNC: 0.6 MG/DL — SIGNIFICANT CHANGE UP (ref 0.2–1.2)
BUN SERPL-MCNC: 14 MG/DL — SIGNIFICANT CHANGE UP (ref 7–23)
CALCIUM SERPL-MCNC: 9 MG/DL — SIGNIFICANT CHANGE UP (ref 8.4–10.5)
CHLORIDE SERPL-SCNC: 109 MMOL/L — HIGH (ref 96–108)
CO2 SERPL-SCNC: 25 MMOL/L — SIGNIFICANT CHANGE UP (ref 22–31)
CREAT SERPL-MCNC: 1.22 MG/DL — SIGNIFICANT CHANGE UP (ref 0.5–1.3)
D DIMER BLD IA.RAPID-MCNC: <150 NG/ML DDU — SIGNIFICANT CHANGE UP
EGFR: 72 ML/MIN/1.73M2 — SIGNIFICANT CHANGE UP
EOSINOPHIL # BLD AUTO: 0.07 K/UL — SIGNIFICANT CHANGE UP (ref 0–0.5)
EOSINOPHIL NFR BLD AUTO: 1.4 % — SIGNIFICANT CHANGE UP (ref 0–6)
GLUCOSE SERPL-MCNC: 144 MG/DL — HIGH (ref 70–99)
HCT VFR BLD CALC: 46.9 % — SIGNIFICANT CHANGE UP (ref 39–50)
HGB BLD-MCNC: 16 G/DL — SIGNIFICANT CHANGE UP (ref 13–17)
IMM GRANULOCYTES NFR BLD AUTO: 0.2 % — SIGNIFICANT CHANGE UP (ref 0–0.9)
LYMPHOCYTES # BLD AUTO: 1.63 K/UL — SIGNIFICANT CHANGE UP (ref 1–3.3)
LYMPHOCYTES # BLD AUTO: 32.1 % — SIGNIFICANT CHANGE UP (ref 13–44)
MCHC RBC-ENTMCNC: 30 PG — SIGNIFICANT CHANGE UP (ref 27–34)
MCHC RBC-ENTMCNC: 34.1 GM/DL — SIGNIFICANT CHANGE UP (ref 32–36)
MCV RBC AUTO: 88 FL — SIGNIFICANT CHANGE UP (ref 80–100)
MONOCYTES # BLD AUTO: 0.39 K/UL — SIGNIFICANT CHANGE UP (ref 0–0.9)
MONOCYTES NFR BLD AUTO: 7.7 % — SIGNIFICANT CHANGE UP (ref 2–14)
NEUTROPHILS # BLD AUTO: 2.94 K/UL — SIGNIFICANT CHANGE UP (ref 1.8–7.4)
NEUTROPHILS NFR BLD AUTO: 57.8 % — SIGNIFICANT CHANGE UP (ref 43–77)
NRBC # BLD: 0 /100 WBCS — SIGNIFICANT CHANGE UP (ref 0–0)
PLATELET # BLD AUTO: 154 K/UL — SIGNIFICANT CHANGE UP (ref 150–400)
POTASSIUM SERPL-MCNC: 3.7 MMOL/L — SIGNIFICANT CHANGE UP (ref 3.5–5.3)
POTASSIUM SERPL-SCNC: 3.7 MMOL/L — SIGNIFICANT CHANGE UP (ref 3.5–5.3)
PROT SERPL-MCNC: 7.8 G/DL — SIGNIFICANT CHANGE UP (ref 6–8.3)
RBC # BLD: 5.33 M/UL — SIGNIFICANT CHANGE UP (ref 4.2–5.8)
RBC # FLD: 12.9 % — SIGNIFICANT CHANGE UP (ref 10.3–14.5)
SARS-COV-2 RNA SPEC QL NAA+PROBE: SIGNIFICANT CHANGE UP
SODIUM SERPL-SCNC: 145 MMOL/L — SIGNIFICANT CHANGE UP (ref 135–145)
TROPONIN I, HIGH SENSITIVITY RESULT: 5 NG/L — SIGNIFICANT CHANGE UP
TROPONIN I, HIGH SENSITIVITY RESULT: 5.8 NG/L — SIGNIFICANT CHANGE UP
WBC # BLD: 5.08 K/UL — SIGNIFICANT CHANGE UP (ref 3.8–10.5)
WBC # FLD AUTO: 5.08 K/UL — SIGNIFICANT CHANGE UP (ref 3.8–10.5)

## 2022-11-01 PROCEDURE — 93010 ELECTROCARDIOGRAM REPORT: CPT

## 2022-11-01 PROCEDURE — 99285 EMERGENCY DEPT VISIT HI MDM: CPT

## 2022-11-01 PROCEDURE — 99223 1ST HOSP IP/OBS HIGH 75: CPT

## 2022-11-01 PROCEDURE — 71046 X-RAY EXAM CHEST 2 VIEWS: CPT | Mod: 26

## 2022-11-01 PROCEDURE — 99222 1ST HOSP IP/OBS MODERATE 55: CPT

## 2022-11-01 RX ORDER — ACETAMINOPHEN 500 MG
650 TABLET ORAL EVERY 6 HOURS
Refills: 0 | Status: DISCONTINUED | OUTPATIENT
Start: 2022-11-01 | End: 2022-11-02

## 2022-11-01 RX ORDER — ASPIRIN/CALCIUM CARB/MAGNESIUM 324 MG
324 TABLET ORAL ONCE
Refills: 0 | Status: COMPLETED | OUTPATIENT
Start: 2022-11-01 | End: 2022-11-01

## 2022-11-01 RX ORDER — LANOLIN ALCOHOL/MO/W.PET/CERES
3 CREAM (GRAM) TOPICAL AT BEDTIME
Refills: 0 | Status: DISCONTINUED | OUTPATIENT
Start: 2022-11-01 | End: 2022-11-02

## 2022-11-01 RX ORDER — ONDANSETRON 8 MG/1
4 TABLET, FILM COATED ORAL EVERY 8 HOURS
Refills: 0 | Status: DISCONTINUED | OUTPATIENT
Start: 2022-11-01 | End: 2022-11-02

## 2022-11-01 RX ORDER — REGADENOSON 0.08 MG/ML
0.4 INJECTION, SOLUTION INTRAVENOUS ONCE
Refills: 0 | Status: DISCONTINUED | OUTPATIENT
Start: 2022-11-01 | End: 2022-11-01

## 2022-11-01 RX ORDER — ASPIRIN/CALCIUM CARB/MAGNESIUM 324 MG
81 TABLET ORAL DAILY
Refills: 0 | Status: DISCONTINUED | OUTPATIENT
Start: 2022-11-02 | End: 2022-11-02

## 2022-11-01 RX ADMIN — Medication 324 MILLIGRAM(S): at 15:40

## 2022-11-01 NOTE — ED ADULT TRIAGE NOTE - CHIEF COMPLAINT QUOTE
Pt c/o intermittent left sided chest pain started 1 week ago yesterday with twitching of right eye area, stated he feels tired

## 2022-11-01 NOTE — ED PROVIDER NOTE - PROGRESS NOTE DETAILS
d/w charmaine (cards) recommends admit for stress and echo. d/w FP resident, he will admit to dr day FP resident relates pt not clinic pt. called hospitalist (ian) she will admit pt

## 2022-11-01 NOTE — CONSULT NOTE ADULT - SUBJECTIVE AND OBJECTIVE BOX
SARAN ESCOBAR  [id number]      HPI:    Saran Escobar is a 51 year old man with past medical history of Remote history of Atrial fibrillation (s/p cardioversion, not on anticoagulation)     ALLERGIES:  No Known Allergies      PAST MEDICAL & SURGICAL HISTORY:  Remote history of atrial fibrillation   History of appendectomy  History of cholecystectomy      CURRENT MEDICATIONS:  aspirin  chewable 324 milliGRAM(s) Oral once      SOCIAL HISTORY:      FAMILY HISTORY:      ROS:  All 10 systems reviewed and positives noted in HPI    OBJECTIVE:    VITAL SIGNS:  Vital Signs Last 24 Hrs  T(C): 37 (01 Nov 2022 12:52), Max: 37 (01 Nov 2022 12:52)  T(F): 98.6 (01 Nov 2022 12:52), Max: 98.6 (01 Nov 2022 12:52)  HR: 63 (01 Nov 2022 12:52) (63 - 63)  BP: 116/75 (01 Nov 2022 12:52) (116/75 - 116/75)  BP(mean): --  RR: 18 (01 Nov 2022 12:52) (18 - 18)  SpO2: 97% (01 Nov 2022 12:52) (97% - 97%)        PHYSICAL EXAM:  General: well appearing, no distress  HEENT: sclera anicteric  Neck: supple, no carotid bruits b/l  CVS: JVP ~ 7 cm H20, RRR, s1, s2, no murmurs/rubs/gallops  Chest: unlabored respirations, clear to auscultation b/l  Abdomen: non-distended  Extremities: no lower extremity edema b/l  Neuro: awake, alert & oriented x 3  Psych: normal affect      LABS:                        16.0   5.08  )-----------( 154      ( 01 Nov 2022 13:10 )             46.9     11-01    145  |  109<H>  |  14  ----------------------------<  144<H>  3.7   |  25  |  1.22    Ca    9.0      01 Nov 2022 13:10    TPro  7.8  /  Alb  4.0  /  TBili  0.6  /  DBili  x   /  AST  44<H>  /  ALT  98<H>  /  AlkPhos  125<H>  11-01            Coronary angiogram (2013):  LM: normal  LAD: normal  LCX: normal  RCA: normal    SARAN ESCOBAR  [id number]      HPI:    Saran Escobar is a 51 year old man with past medical history of Remote history of Atrial fibrillation (s/p cardioversion, not on anticoagulation)     ALLERGIES:  No Known Allergies      PAST MEDICAL & SURGICAL HISTORY:  Remote history of atrial fibrillation   History of appendectomy  History of cholecystectomy      CURRENT MEDICATIONS:  aspirin  chewable 324 milliGRAM(s) Oral once      SOCIAL HISTORY:      FAMILY HISTORY:      ROS:  All 10 systems reviewed and positives noted in HPI    OBJECTIVE:    VITAL SIGNS:  Vital Signs Last 24 Hrs  T(C): 37 (01 Nov 2022 12:52), Max: 37 (01 Nov 2022 12:52)  T(F): 98.6 (01 Nov 2022 12:52), Max: 98.6 (01 Nov 2022 12:52)  HR: 63 (01 Nov 2022 12:52) (63 - 63)  BP: 116/75 (01 Nov 2022 12:52) (116/75 - 116/75)  BP(mean): --  RR: 18 (01 Nov 2022 12:52) (18 - 18)  SpO2: 97% (01 Nov 2022 12:52) (97% - 97%)        PHYSICAL EXAM:  General: well appearing, no distress  HEENT: sclera anicteric  Neck: supple, no carotid bruits b/l  CVS: JVP ~ 7 cm H20, RRR, s1, s2, no murmurs/rubs/gallops  Chest: unlabored respirations, clear to auscultation b/l  Abdomen: non-distended  Extremities: no lower extremity edema b/l  Neuro: awake, alert & oriented x 3  Psych: normal affect      LABS:                        16.0   5.08  )-----------( 154      ( 01 Nov 2022 13:10 )             46.9     11-01    145  |  109<H>  |  14  ----------------------------<  144<H>  3.7   |  25  |  1.22    Ca    9.0      01 Nov 2022 13:10    TPro  7.8  /  Alb  4.0  /  TBili  0.6  /  DBili  x   /  AST  44<H>  /  ALT  98<H>  /  AlkPhos  125<H>  11-01        Coronary angiogram (2013):  LM: normal  LAD: normal  LCX: normal  RCA: normal     ECG (7/26/19): sinus rhythm, inferior T wave inversions, anterolateral ST depressions    ECG (11/11/22): sinus rhythm, inferior T wave inversions, anteroseptal T wave inversions and ST depressions (overall similar to old ECG)   SARAN ESCOBAR  [id number]      HPI:    Saran Escobar is a 51 year old man with past medical history of Remote history of Atrial fibrillation (s/p cardioversion, not on anticoagulation) presents with left-sided chest discomfort.    His wife reports that they have been under significant stress lately. The patient reports having left-sided chest discomfort for the past week which can be present at rest and when feeling stressed, not particularly worse on exertion. Reports fatigue and mild shortness of breath on exertion. Has occasional palpitations and occasional dizziness, no syncope.     ALLERGIES:  No Known Allergies      PAST MEDICAL & SURGICAL HISTORY:  Remote history of atrial fibrillation   History of appendectomy  History of cholecystectomy      CURRENT MEDICATIONS:  aspirin  chewable 324 milliGRAM(s) Oral once      SOCIAL HISTORY:  Vaping use  Occasional alcohol use     FAMILY HISTORY:  Father with heart disease    ROS:  All 10 systems reviewed and positives noted in HPI    OBJECTIVE:    VITAL SIGNS:  Vital Signs Last 24 Hrs  T(C): 37 (01 Nov 2022 12:52), Max: 37 (01 Nov 2022 12:52)  T(F): 98.6 (01 Nov 2022 12:52), Max: 98.6 (01 Nov 2022 12:52)  HR: 63 (01 Nov 2022 12:52) (63 - 63)  BP: 116/75 (01 Nov 2022 12:52) (116/75 - 116/75)  BP(mean): --  RR: 18 (01 Nov 2022 12:52) (18 - 18)  SpO2: 97% (01 Nov 2022 12:52) (97% - 97%)        PHYSICAL EXAM:  General: no acute distress  HEENT: sclera anicteric  Neck: supple, no carotid bruits b/l  CVS: JVP ~ 7 cm H20, RRR, s1, s2, no murmurs/rubs/gallops  Pulm: unlabored respirations, clear to auscultation b/l  Chest: non-tender to palpation   Abdomen: non-distended  Extremities: no lower extremity edema b/l  Neuro: awake, alert & oriented x 3  Psych: normal affect      LABS:                        16.0   5.08  )-----------( 154      ( 01 Nov 2022 13:10 )             46.9     11-01    145  |  109<H>  |  14  ----------------------------<  144<H>  3.7   |  25  |  1.22    Ca    9.0      01 Nov 2022 13:10    TPro  7.8  /  Alb  4.0  /  TBili  0.6  /  DBili  x   /  AST  44<H>  /  ALT  98<H>  /  AlkPhos  125<H>  11-01        Coronary angiogram (2013):  LM: normal  LAD: normal  LCX: normal  RCA: normal     ECG (7/26/19): sinus rhythm, inferior T wave inversions, anterolateral ST depressions    ECG (11/11/22): sinus rhythm, inferior T wave inversions, anteroseptal T wave inversions and ST depressions (overall similar to old ECG)   SARAN ESCOBAR  [id number]      HPI:    Saran Escobar is a 51 year old man with past medical history of Cardiac arrhythmia (notes indicate history of Atrial fibrillation in 2013) presents with left-sided chest discomfort.    His wife reports that they have been under significant stress lately. The patient reports having left-sided chest discomfort for the past week which can be present at rest and when feeling stressed, not particularly worse on exertion. Reports fatigue and mild shortness of breath on exertion. Has occasional palpitations and occasional dizziness, no syncope.     ALLERGIES:  No Known Allergies      PAST MEDICAL & SURGICAL HISTORY:  Cardiac arrhythmia, possible history of atrial fibrillation   History of appendectomy  History of cholecystectomy      CURRENT MEDICATIONS:  aspirin  chewable 324 milliGRAM(s) Oral once      SOCIAL HISTORY:  Vaping use  Occasional alcohol use     FAMILY HISTORY:  Father with heart disease    ROS:  All 10 systems reviewed and positives noted in HPI    OBJECTIVE:    VITAL SIGNS:  Vital Signs Last 24 Hrs  T(C): 37 (01 Nov 2022 12:52), Max: 37 (01 Nov 2022 12:52)  T(F): 98.6 (01 Nov 2022 12:52), Max: 98.6 (01 Nov 2022 12:52)  HR: 63 (01 Nov 2022 12:52) (63 - 63)  BP: 116/75 (01 Nov 2022 12:52) (116/75 - 116/75)  BP(mean): --  RR: 18 (01 Nov 2022 12:52) (18 - 18)  SpO2: 97% (01 Nov 2022 12:52) (97% - 97%)        PHYSICAL EXAM:  General: no acute distress  HEENT: sclera anicteric  Neck: supple, no carotid bruits b/l  CVS: JVP ~ 7 cm H20, RRR, s1, s2, no murmurs/rubs/gallops  Pulm: unlabored respirations, clear to auscultation b/l  Chest: non-tender to palpation   Abdomen: non-distended  Extremities: no lower extremity edema b/l  Neuro: awake, alert & oriented x 3  Psych: normal affect      LABS:                        16.0   5.08  )-----------( 154      ( 01 Nov 2022 13:10 )             46.9     11-01    145  |  109<H>  |  14  ----------------------------<  144<H>  3.7   |  25  |  1.22    Ca    9.0      01 Nov 2022 13:10    TPro  7.8  /  Alb  4.0  /  TBili  0.6  /  DBili  x   /  AST  44<H>  /  ALT  98<H>  /  AlkPhos  125<H>  11-01        Coronary angiogram (2013):  LM: normal  LAD: normal  LCX: normal  RCA: normal     ECG (7/26/19): sinus rhythm, inferior T wave inversions, anterolateral ST depressions    ECG (11/11/22): sinus rhythm, inferior T wave inversions, anteroseptal T wave inversions and ST depressions (overall similar to old ECG)

## 2022-11-01 NOTE — H&P ADULT - NSHPREVIEWOFSYSTEMS_GEN_ALL_CORE
CONSTITUTIONAL: No fever, No fatigue  EYES: No eye pain, No discharge  ENMT:   No ear pain; No sinus or throat pain  NECK: No pain or stiffness  RESPIRATORY: No cough; No shortness of breath  CARDIOVASCULAR: positive chest pain, positive palpitations, no leg swelling  GASTROINTESTINAL: No abdominal or epigastric pain. No nausea, vomiting; No diarrhea or constipation.   GENITOURINARY: No dysuria, frequency  NEUROLOGICAL: No headaches, numbness; No dizziness  SKIN: No itching, burning  ENDOCRINE: No heat or cold intolerance; No hair loss  MUSCULOSKELETAL: No joint swelling; No muscle or extremity pain  PSYCHIATRIC: No depression, anxiety  HEME/LYMPH: No easy bruising, or bleeding gums  ALLERGY AND IMMUNOLOGIC: No hives or eczema CONSTITUTIONAL: No fever, positive fatigue  EYES: No eye pain, No discharge  ENMT:   No ear pain; No sinus or throat pain  NECK: No pain or stiffness  RESPIRATORY: No cough; No shortness of breath  CARDIOVASCULAR: positive chest pain, positive palpitations, no leg swelling  GASTROINTESTINAL: No abdominal or epigastric pain. No nausea, vomiting; No diarrhea or constipation.   GENITOURINARY: No dysuria, frequency  NEUROLOGICAL: No headaches, numbness; No dizziness  SKIN: No itching, burning  ENDOCRINE: No heat or cold intolerance; No hair loss  MUSCULOSKELETAL: No joint swelling; No muscle or extremity pain  PSYCHIATRIC: No depression, anxiety  HEME/LYMPH: No easy bruising, or bleeding gums  ALLERGY AND IMMUNOLOGIC: No hives or eczema

## 2022-11-01 NOTE — H&P ADULT - NSHPSOCIALHISTORY_GEN_ALL_CORE
Lives with wife  Drives, uses stairs. Ambulates without assistance, performs ADLs independently  Occupation:    Denies smoking, social wine use, denies illicit drug use      Mother  age 60 hx of stomach infection  Father  age 78 hx of MI/CAD

## 2022-11-01 NOTE — H&P ADULT - HISTORY OF PRESENT ILLNESS
51M with no significant PMH presents with intermittent left chest pain for the past week. Denies radiation. Pain occurs at rest, not worse with exertion. Admits to fatigue, SERNA. Denies fever, chills, abd pain, n/v/c/d. Patient has an appt with cardiology tomorrow. Admits to increased stress at home. Reports no significant personal cardiac hx. States he was cardioverted in the past for a fib.     In the ED: temp 98.6, /75, HR 63, RR 18, SpO2 97% RA  Troponin 5.0 -> 5.8. AST/ALT: 44/98.   Chest x-ray personally reviewed: no acute infiltrate   EKG personally reviewed: SR, T wave inversions ST depressions 65  Received ASA 324mg x 1.  51M with no significant PMH presents with intermittent left chest pain for the past week. Denies radiation. Pain occurs at rest, not worse with exertion. Admits to fatigue, SERNA. Took Aspirin which improved his symptoms. States this happened before - 2 weeks ago but symptoms resolved without intervention. Denies fever, chills, abd pain, n/v/c/d. Patient has an appt with cardiology tomorrow. Had coronary angiogram in 2013 which was normal. Admits to increased stress at home. Reports no significant personal cardiac hx. States he was cardioverted in the past for a fib. Reports father has hx of cardiac disease.    In the ED: temp 98.6, /75, HR 63, RR 18, SpO2 97% RA  Troponin 5.0 -> 5.8. AST/ALT: 44/98.   Chest x-ray personally reviewed: no acute infiltrate   EKG personally reviewed: SR, T wave inversions ST depressions 65  Received ASA 324mg x 1.

## 2022-11-01 NOTE — CONSULT NOTE ADULT - ASSESSMENT
Assessment:  Jaden Cooney is a 51 year old man with past medical history of Remote history of Atrial fibrillation (s/p cardioversion, not on anticoagulation) presents with chest discomfort.    Assessment:  Jaden Cooney is a 51 year old man with past medical history of Remote history of Atrial fibrillation (s/p cardioversion, not on anticoagulation) presents with chest discomfort.     The patient reports left-sided chest discomfort for the past week mostly when feeling  Assessment:  Jaden Cooney is a 51 year old man with past medical history of Cardiac arrhythmia (notes indicate history of Atrial fibrillation in 2013) presents with left-sided chest discomfort.    The patient reports left-sided chest discomfort for the past week mostly when feeling stressed. Also associated with dyspnea and fatigue on exertion. ECG consistent with sinus rhythm, inferior and anteroseptal ST abnormalities which are similar to old ECG from 2019. Troponins are negative x 2, no signs of an acute coronary syndrome. Telemetry consistent with normal sinus rhythm. D-dimer negative. Prior coronary angiogram in 2013 was normal.     Recommendations:  [] Chest discomfort: Patient has intermediate risk features, plan to check echocardiogram to evaluate for structural heart disease. Check nuclear stress test to evaluate for ischemic heart disease, please keep NPO after MN tonight and hold beta blockers. Continue to monitor on telemetry. S/p Aspirin 324 mg PO once.   [] History of cardiac arrhythmia: Noted to have history of atrial fibrillation from notes from 2013. Currently sinus rhythm, continue to monitor on telemetry     We will continue to follow along.    Agnieszka Nova MD  Cardiology

## 2022-11-01 NOTE — ED PROVIDER NOTE - CLINICAL SUMMARY MEDICAL DECISION MAKING FREE TEXT BOX
Patient complaining of intermittent left chest pain for the past week which has been constant for the past 2 days.  Patient relates pain nonexertional nonpleuritic unrelated to p.o. intake.  Patient denies fevers chills cough short of breath abdominal pain nausea vomiting edema calf pain or travel.  Patient relates he made an appointment with a cardiologist for tomorrow but does not know the name.  Plan we will get EKG chest x-ray labs including D-dimer troponin differential including but not limited to MI arrhythmia electrolyte abnormality anemia PE pneumonia pneumothorax pleural effusion

## 2022-11-01 NOTE — ED PROVIDER NOTE - OBJECTIVE STATEMENT
Patient complaining of intermittent left chest pain for the past week which has been constant for the past 2 days.  Patient relates pain nonexertional nonpleuritic unrelated to p.o. intake.  Patient denies fevers chills cough short of breath abdominal pain nausea vomiting edema calf pain or travel.  Patient relates he made an appointment with a cardiologist for tomorrow but does not know the name.

## 2022-11-01 NOTE — ED ADULT NURSE NOTE - NSSUHOSCREENINGYN_ED_ALL_ED
Physical Therapy  Visit Type: treatment and discharge  Precautions:  Medical precautions: ; standard precautions. 8/24: Pt is a 58 y/o female admitted as OBS patient s/p R ZEYNEP ([address]) -> 8ORTHO    PT/OT consulted, AAT, WBAT    Therapist wearing gloves and surgical mask during session.    Patient was wearing mask throughout duration of therapy session.   Lines:     Basic: capped IV      Lines in chart and on patient reviewed, precautions maintained throughout session.  Lower Extremity:    Right:  weight bearing: as tolerated.  hip - direct anterior precautions  Hearing: no hearing deficits  Vision:     Current vision: no visual deficits    SUBJECTIVE  Patient agreed to participate in therapy this date.  \"I am feeling great.\"  Patient / Family Goal: return to previous functional status and return home    Pain     At onset of session (out of 10): 0  RN informed on pain level     OBJECTIVE   Level of consciousness: alert    Oriented to person, place, time and situation     Arousal alertness: appropriate responses to stimuli    Affect/Behavior: alert, pleasant, cooperative and appropriate  Functional Communication/Cognition    Overall status:  Within functional limits    Form of communication:  Verbal and nods/gestures appropriately   Attention span:  Appears intact    Commands: follows all commands and directions consistently.    Transition between tasks: transitions tasks without difficulty.    Safety judgement: good awareness of safety precautions.    Awareness of deficits: fully aware of deficits.  Balance (trials measured in sec unless otherwise indicted)    Sitting: Static: stand by assist back unsupported, Dynamic: stand by assist back unsupported    Standing - Firm Surface - Eyes Open: Static: stand by assist double upper extremity support  Dynamic: stand by assist double upper extremity support    Bed Mobility:      Training completed:      Bed mobility was not assessed during this session as pt was up in  chair upon arrival and was able to complete bed mobility with SBA during yesterday's PT session.  Transfers:    Assistive devices: gait belt, 2-wheeled walker and 1 person    Sit to stand: stand by assist    Stand to sit: stand by assist  Training completed:    Tasks: sit to stand and stand to sit    Education details: body mechanics, patient safety and patient demonstrates understanding  Gait/Ambulation:     Assistance: stand by assist   Assistive device: gait belt, 1 person and 2-wheeled walker    Distance (ft): 100; 100    Pattern: step through    Type: antalgic    Gait description: Right: decreased stance time    Surface: even and tile  Training Completed:    Tasks: gait training on level surfaces    Education details: patient safety, body mechanics and patient demonstrates understanding  Stair Mobility:    Number of steps: 8;     Assistance: minimal assist and contact guard/touching/steadying assist    Rails: left rail only    Pattern: step to    Devices used: cane and gait belt  Training completed:    Tasks: stair training and assistive device use    Education details: body mechanics, patient safety and patient demonstrates understanding    Pt educated on using a cane for additional stability while navigating stairs as well as proper sequencing.      Interventions     Training provided: activity tolerance, balance retraining, body mechanics, positioning, gait training, safety training, stair training, transfer training and use of assistive device  Patient instructed on home safety and fall prevention in relation to functional mobility. Written information was also provided to the patient.  Skilled input: Verbal instruction/cues, tactile instruction/cues and posture correction  Verbal Consent: Writer verbally educated and received verbal consent for hand placement, positioning of patient, and techniques to be performed today from patient for clothing adjustments for techniques and therapist position for  techniques as described above and how they are pertinent to the patient's plan of care.       ASSESSMENT   Impairments: activity tolerance, strength, range of motion, pain and edema  Functional Limitations: all functional mobility       Discharge Recommendations  Recommendation for Discharge: PT IL: Patient is appropriate for Physical Therapy 1-3 times per week, Patient requires  intermittent assistance to perform mobility and/or ADLs safely  PT/OT Mobility Equipment for Discharge: none  PT/OT ADL Equipment for Discharge: none  PT Identified Barriers to Discharge: Pain    Pain at End of Session: RN informed on pain level  Pain: 1/10    Progress: improving as expected     • Skilled therapy is not required due to Pt will have assistance from spouse and daughter with all functional mobility upon discharge home.    Education Provided:   Learning Assessment:  - Primary learner: patient  - Are they ready to learn: yes  - Preferred learning style: written, verbal and demonstration  - Barriers to learning: no barriers apparent at this time    Patient at End of Session:   Location: in chair  Safety measures: call light within reach and lines intact  Handoff to: nurse    PLAN   Suggestions for next session as indicated: PT Frequency: Twice a day  Frequency Comments: Discharged from IP PT on 8.25    Interventions: balance, bed mobility, strengthening, ROM, gait training, safety education, patient/family training, stairs retraining, functional transfer training, body mechanics and endurance training Pt is safe to be discharged home as she will have help from spouse and daughter upon discharge.  Pt has all needed equipment for discharge and has been issued written handouts with safety reminders.  Agreement to plan and goals: patient agrees with goals and treatment plan        GOALS  Review Date: 8/25/2022  Long Term Goals: (to be met by time of discharge from hospital)  Sit to supine: Patient will complete sit to supine stand by  assist.  Status: met   Supine to sit: Patient will complete supine to sit stand by assist.  Status: met   Sit to stand: Patient will complete sit to stand transfer with 2-wheeled walker, stand by assist.   Status: met   Stand to sit: Patient will complete stand to sit transfer with 2-wheeled walker, stand by assist.   Status: met   Ambulation (even): Patient will ambulate on even surface for 150 feet with 2-wheeled walker, stand by assist.   Status: met   Flight of stairs: Patient will ambulate a flight of stairs with using 1 rail, minimal assist.  Status: met     Documented in the chart in the following areas: Assessment.      Therapy procedure time and total treatment time can be found documented on the Time Entry flowsheet   Yes - the patient is able to be screened

## 2022-11-01 NOTE — H&P ADULT - NSHPPHYSICALEXAM_GEN_ALL_CORE
VITALS:   T(C): 37 (11-01-22 @ 16:42), Max: 37 (11-01-22 @ 12:52)  HR: 60 (11-01-22 @ 16:42) (60 - 63)  BP: 117/70 (11-01-22 @ 16:42) (116/75 - 117/70)  RR: 19 (11-01-22 @ 16:42) (18 - 19)  SpO2: 98% (11-01-22 @ 16:42) (97% - 98%)    GENERAL: NAD  HEENT:  AT/NC, anicteric, moist mucous membranes, EOMI, PERRL, no lid-lag, conjunctiva and sclera clear  CHEST/LUNG:  CTA b/l, no rales, wheezes, or rhonchi,  normal respiratory effort, no intercostal retractions  HEART:  RRR, S1, S2, no murmurs; no pitting edema  ABDOMEN:  BS+, soft, nontender, nondistended; obese  MSK/EXTREMITIES: 2+ peripheral pulses, no clubbing or cyanosis  NERVOUS SYSTEM: answers questions and follows commands appropriately A&Ox3 grossly moves all extremities, no focal deficits, speech clear  SKIN: No rashes or lesions  PSYCH: Appropriate affect, Alert & Awake; Good judgement

## 2022-11-01 NOTE — H&P ADULT - ASSESSMENT
51M with no significant PMH presents with intermittent left chest pain for the past week. Admitted for chest pain r/o ACS.    #Chest pain r/o ACS  -Admit to tele  -Start ASA 81mg daily  -Trend cardiac enzymes, negative x 2  -Check TTE   -Check lipid panel, A1C  -Discussed with cardio Dr Nova, plan for stress test in AM    #Transaminitis  Possibly hepatic steatosis  -Check hepatitis panel  -If elevated plan for abd sono    #Prophylactic Measure  -DVT ppx: SCDs 51M with no significant PMH presents with intermittent left chest pain for the past week. Admitted for chest pain r/o ACS.    #Chest pain r/o ACS  -Admit to tele  -Start ASA 81mg daily  -Trend cardiac enzymes, negative x 2  -Check TTE   -Check lipid panel, A1C  -Discussed with cardio Dr Nova, plan for stress test in AM    #Transaminitis  Possibly hepatic steatosis  -Check hepatitis panel  -If elevated plan for abd sono    #Prophylactic Measure  -DVT ppx: SCDs    Updated wife via patient's cellphone on FaceTime per his request, aware and in agreement with above.

## 2022-11-02 ENCOUNTER — TRANSCRIPTION ENCOUNTER (OUTPATIENT)
Age: 52
End: 2022-11-02

## 2022-11-02 VITALS — WEIGHT: 207.9 LBS

## 2022-11-02 LAB
A1C WITH ESTIMATED AVERAGE GLUCOSE RESULT: 5.6 % — SIGNIFICANT CHANGE UP (ref 4–5.6)
ANION GAP SERPL CALC-SCNC: 10 MMOL/L — SIGNIFICANT CHANGE UP (ref 5–17)
BUN SERPL-MCNC: 15 MG/DL — SIGNIFICANT CHANGE UP (ref 7–23)
CALCIUM SERPL-MCNC: 8.6 MG/DL — SIGNIFICANT CHANGE UP (ref 8.4–10.5)
CHLORIDE SERPL-SCNC: 105 MMOL/L — SIGNIFICANT CHANGE UP (ref 96–108)
CHOLEST SERPL-MCNC: 177 MG/DL — SIGNIFICANT CHANGE UP
CO2 SERPL-SCNC: 24 MMOL/L — SIGNIFICANT CHANGE UP (ref 22–31)
CREAT SERPL-MCNC: 1.35 MG/DL — HIGH (ref 0.5–1.3)
EGFR: 63 ML/MIN/1.73M2 — SIGNIFICANT CHANGE UP
ESTIMATED AVERAGE GLUCOSE: 114 MG/DL — SIGNIFICANT CHANGE UP (ref 68–114)
GLUCOSE SERPL-MCNC: 165 MG/DL — HIGH (ref 70–99)
HAV IGM SER-ACNC: SIGNIFICANT CHANGE UP
HBV CORE IGM SER-ACNC: SIGNIFICANT CHANGE UP
HBV SURFACE AG SER-ACNC: SIGNIFICANT CHANGE UP
HCT VFR BLD CALC: 44.2 % — SIGNIFICANT CHANGE UP (ref 39–50)
HCV AB S/CO SERPL IA: 0.13 S/CO — SIGNIFICANT CHANGE UP (ref 0–0.99)
HCV AB SERPL-IMP: SIGNIFICANT CHANGE UP
HDLC SERPL-MCNC: 33 MG/DL — LOW
HGB BLD-MCNC: 15.1 G/DL — SIGNIFICANT CHANGE UP (ref 13–17)
LIPID PNL WITH DIRECT LDL SERPL: 104 MG/DL — HIGH
MCHC RBC-ENTMCNC: 29.8 PG — SIGNIFICANT CHANGE UP (ref 27–34)
MCHC RBC-ENTMCNC: 34.2 GM/DL — SIGNIFICANT CHANGE UP (ref 32–36)
MCV RBC AUTO: 87.2 FL — SIGNIFICANT CHANGE UP (ref 80–100)
NON HDL CHOLESTEROL: 144 MG/DL — HIGH
NRBC # BLD: 0 /100 WBCS — SIGNIFICANT CHANGE UP (ref 0–0)
PLATELET # BLD AUTO: 153 K/UL — SIGNIFICANT CHANGE UP (ref 150–400)
POTASSIUM SERPL-MCNC: 3.6 MMOL/L — SIGNIFICANT CHANGE UP (ref 3.5–5.3)
POTASSIUM SERPL-SCNC: 3.6 MMOL/L — SIGNIFICANT CHANGE UP (ref 3.5–5.3)
RBC # BLD: 5.07 M/UL — SIGNIFICANT CHANGE UP (ref 4.2–5.8)
RBC # FLD: 13.1 % — SIGNIFICANT CHANGE UP (ref 10.3–14.5)
SODIUM SERPL-SCNC: 139 MMOL/L — SIGNIFICANT CHANGE UP (ref 135–145)
TRIGL SERPL-MCNC: 201 MG/DL — HIGH
WBC # BLD: 5.97 K/UL — SIGNIFICANT CHANGE UP (ref 3.8–10.5)
WBC # FLD AUTO: 5.97 K/UL — SIGNIFICANT CHANGE UP (ref 3.8–10.5)

## 2022-11-02 PROCEDURE — 71046 X-RAY EXAM CHEST 2 VIEWS: CPT

## 2022-11-02 PROCEDURE — 85027 COMPLETE CBC AUTOMATED: CPT

## 2022-11-02 PROCEDURE — 80061 LIPID PANEL: CPT

## 2022-11-02 PROCEDURE — 83036 HEMOGLOBIN GLYCOSYLATED A1C: CPT

## 2022-11-02 PROCEDURE — 80074 ACUTE HEPATITIS PANEL: CPT

## 2022-11-02 PROCEDURE — 84484 ASSAY OF TROPONIN QUANT: CPT

## 2022-11-02 PROCEDURE — 93306 TTE W/DOPPLER COMPLETE: CPT

## 2022-11-02 PROCEDURE — 80053 COMPREHEN METABOLIC PANEL: CPT

## 2022-11-02 PROCEDURE — 80048 BASIC METABOLIC PNL TOTAL CA: CPT

## 2022-11-02 PROCEDURE — 93005 ELECTROCARDIOGRAM TRACING: CPT

## 2022-11-02 PROCEDURE — 93306 TTE W/DOPPLER COMPLETE: CPT | Mod: 26

## 2022-11-02 PROCEDURE — 99239 HOSP IP/OBS DSCHRG MGMT >30: CPT

## 2022-11-02 PROCEDURE — 85025 COMPLETE CBC W/AUTO DIFF WBC: CPT

## 2022-11-02 PROCEDURE — 78452 HT MUSCLE IMAGE SPECT MULT: CPT

## 2022-11-02 PROCEDURE — 93017 CV STRESS TEST TRACING ONLY: CPT

## 2022-11-02 PROCEDURE — 93016 CV STRESS TEST SUPVJ ONLY: CPT

## 2022-11-02 PROCEDURE — 93018 CV STRESS TEST I&R ONLY: CPT

## 2022-11-02 PROCEDURE — A9500: CPT

## 2022-11-02 PROCEDURE — 85379 FIBRIN DEGRADATION QUANT: CPT

## 2022-11-02 PROCEDURE — 78452 HT MUSCLE IMAGE SPECT MULT: CPT | Mod: 26

## 2022-11-02 PROCEDURE — 87635 SARS-COV-2 COVID-19 AMP PRB: CPT

## 2022-11-02 PROCEDURE — 99285 EMERGENCY DEPT VISIT HI MDM: CPT

## 2022-11-02 PROCEDURE — 36415 COLL VENOUS BLD VENIPUNCTURE: CPT

## 2022-11-02 PROCEDURE — 99232 SBSQ HOSP IP/OBS MODERATE 35: CPT | Mod: 25

## 2022-11-02 RX ADMIN — Medication 81 MILLIGRAM(S): at 12:58

## 2022-11-02 NOTE — DIETITIAN INITIAL EVALUATION ADULT - ORAL INTAKE PTA/DIET HISTORY
Patient Does Not Follow Diet @Home  Consumes 3 Meals a Day but Snacks Frequently  Wife Usually Cooks For Patient But Usually Consumes Takeout/Delivery  Does Take Vitamin/Supplements @Home (Potassium, Mg)

## 2022-11-02 NOTE — PATIENT PROFILE ADULT - NSPROGENPREVTRANSF_GEN_A_NUR
Keep Steri-Strips clean, dry, and place until they drop and fall from her arm.  After that you can keep the wound clean with soap and water.  Give ibuprofen and Tylenol to help with pain.  Give antibiotics to help prevent underlying skin tissue infection. Follow-up with your primary care provider next few days to reevaluate the wound and ensure it is improving.  Return here to the ER for any change, worsening symptoms, or any additional concerns include not limited to severe redness, pain or swelling, purulent drainage from the wound, fever greater than 100.4  
no history of blood product transfusion

## 2022-11-02 NOTE — DISCHARGE NOTE PROVIDER - PROVIDER TOKENS
PROVIDER:[TOKEN:[[id number]]] PROVIDER:[TOKEN:[[id number]]],PROVIDER:[TOKEN:[[id number]]]

## 2022-11-02 NOTE — DISCHARGE NOTE PROVIDER - NSDCFUADDAPPT_GEN_ALL_CORE_FT
We made you a hospital followup appointment with Dr. Herminio Balbuena on 11/9/2022 at 2:00pm, office #[phone].

## 2022-11-02 NOTE — DISCHARGE NOTE PROVIDER - HOSPITAL COURSE
Hospital Course  HPI:  51M with no significant PMH presents with intermittent left chest pain for the past week. Denies radiation. Pain occurs at rest, not worse with exertion. Admits to fatigue, SERNA. Took Aspirin which improved his symptoms. States this happened before - 2 weeks ago but symptoms resolved without intervention. Denies fever, chills, abd pain, n/v/c/d. Patient has an appt with cardiology tomorrow. Had coronary angiogram in 2013 which was normal. Admits to increased stress at home. Reports no significant personal cardiac hx. States he was cardioverted in the past for a fib. Reports father has hx of cardiac disease.    In the ED: temp 98.6, /75, HR 63, RR 18, SpO2 97% RA  Troponin 5.0 -> 5.8. AST/ALT: 44/98.   Chest x-ray personally reviewed: no acute infiltrate   EKG personally reviewed: SR, T wave inversions ST depressions 65  Received ASA 324mg x 1.  (01 Nov 2022 15:26)    You were admitted for chest pain.   You were evaluated for possible underlying coronary artery disease with EKGs, labs, echocardiogram and a stress test which did not reveal and evidence of underlying coronary artery disease.  Your chest pain is likely related to stress.      Please make sure to follow up with your primary care physician Dr. Durham/ANITA Balbuena.    Discharging Provider:  Koko Seaman MD  Contact Info: Cell [phone] - Please call with any questions or concerns.    Outpatient Provider:  ANITA Garcia

## 2022-11-02 NOTE — DISCHARGE NOTE NURSING/CASE MANAGEMENT/SOCIAL WORK - NSDCFUADDAPPT_GEN_ALL_CORE_FT
We made you a hospital followup appointment with Dr. Herminio Balbuena on 11/9/2022 at 2:00pm, office #[phone].

## 2022-11-02 NOTE — DISCHARGE NOTE PROVIDER - CARE PROVIDER_API CALL
Reina Durham)  Family Medicine  [address] [place] [address], [address]  [place] [address]  Phone: [phone]  Fax: [phone]  Follow Up Time:    Reina Durham)  Family Medicine  [address] [place] [address], [address]  [place] [address]  Phone: [phone]  Fax: [phone]  Follow Up Time:     Hayden Guardado (DO)  Gastroenterology  [address] [place] [address]  Phone: [phone]  Fax: [phone]  Follow Up Time:

## 2022-11-02 NOTE — DIETITIAN INITIAL EVALUATION ADULT - OTHER INFO
Initial Nutrition Assessment   51yr Old Male   Denies Food Allergy/Intolerance  Tolerates Diet Well - No Chewing/Swallowing Complications (Per Patient)  No Pressure Ulcers (as Per Nursing Flow Sheets)  No Edema Noted (as Per Nursing Flow Sheets)  No Recent Nausea/Vomiting/Diarrhea/Constipation (as Per Patient)

## 2022-11-02 NOTE — PROGRESS NOTE ADULT - ASSESSMENT
Assessment:  Jaden Cooney is a 51 year old man with past medical history of Cardiac arrhythmia (notes indicate history of Atrial fibrillation in 2013) presents with left-sided chest discomfort.    The patient reports left-sided chest discomfort for the past week mostly when feeling stressed. Also associated with dyspnea and fatigue on exertion. ECG consistent with sinus rhythm, inferior and anteroseptal ST abnormalities which are similar to old ECG from 2019. Troponins are negative x 2, no signs of an acute coronary syndrome. Telemetry consistent with normal sinus rhythm. CXR unremarkable. D-dimer negative. Prior coronary angiogram in 2013 was normal.     Recommendations:  [] Chest discomfort: Patient has intermediate risk features, plan to check echocardiogram to evaluate for structural heart disease. Follow up nuclear stress test today to evaluate for ischemic heart disease. Hold beta blockers. S/p Aspirin 324 mg PO once.   [] History of cardiac arrhythmia: Noted to have history of atrial fibrillation from notes from 2013. Currently sinus rhythm, no events on telemetry     Will sign out to cardiologist to follow along tomorrow.    Agnieszka Nova MD  Cardiology          Labs are already in system    Lipids cmp???   Assessment:  Jaden Cooney is a 51 year old man with past medical history of Cardiac arrhythmia (notes indicate history of Atrial fibrillation in 2013) presents with left-sided chest discomfort.    The patient reports left-sided chest discomfort for the past week mostly when feeling stressed. Also associated with dyspnea and fatigue on exertion. ECG consistent with sinus rhythm, inferior and anteroseptal ST abnormalities which are similar to old ECG from 2019. Troponins are negative x 2, no signs of an acute coronary syndrome. Telemetry consistent with normal sinus rhythm. CXR unremarkable. D-dimer negative. Prior coronary angiogram in 2013 was normal.     Recommendations:  [] Chest discomfort: Patient has intermediate risk features, plan to check echocardiogram to evaluate for structural heart disease. Follow up nuclear stress test today to evaluate for ischemic heart disease. Hold beta blockers. S/p Aspirin 324 mg PO once.   [] History of cardiac arrhythmia: Noted to have history of atrial fibrillation from notes from 2013. Currently sinus rhythm, no events on telemetry     Addendum:    TTE Report:   1. The heart rate is bradycardic 50s BPM throughout the study.   2. Normal global left ventricular systolic function.   3. Left ventricular ejection fraction, by visual estimation, is 65 to 70%.   4. Calculated LVEF by Simpsons Biplane Method 65%. Normal left ventricle average global longitudinal strain.   5. There is mild septal left ventricular hypertrophy.   6. Normal left ventricular internal cavity size.   7. Normal right ventricular size and function.   8. The left atrium is normal in size.   9. The right atrium is normal in size.  10. Trace mitral valve regurgitation.  11. The aortic valve is not well visualized, appears trileaflet. No   aortic valve stenosis.  12. There is no evidence of pericardial effusion.    Nuclear stress test results:  IMPRESSION:  Exercise SPECT Myocardial Perfusion Imaging revealed diaphragmatic attenuation artifact, otherwise no evidence of reversible or fixed perfusion defects.  Normal left ventricular ejection fraction of 63 % (normal: 50% or   greater).  No regional wall motion abnormalities.    Echo consistent with normal left and right ventricle systolic function, no significant valvular disease. Nuclear stress test with diaphragmatic attenuation artifact, otherwise no perfusion abnormalities. Patient may be discharged with outpatient cardiology follow-up for further risk stratification. Results discussed with patient and wife.       Agnieszka Nova MD  Cardiology          Assessment:  Jaden Cooney is a 51 year old man with past medical history of Cardiac arrhythmia (notes indicate history of Atrial fibrillation in 2013) presents with left-sided chest discomfort.    The patient reports left-sided chest discomfort for the past week mostly when feeling stressed. Also associated with dyspnea and fatigue on exertion. ECG consistent with sinus rhythm, inferior and anteroseptal ST abnormalities which are similar to old ECG from 2019. Troponins are negative x 2, no signs of an acute coronary syndrome. Telemetry consistent with normal sinus rhythm. CXR unremarkable. D-dimer negative. Prior coronary angiogram in 2013 was normal.     Recommendations:  [] Chest discomfort: Patient has intermediate risk features, plan to check echocardiogram to evaluate for structural heart disease. Follow up nuclear stress test today to evaluate for ischemic heart disease. Hold beta blockers. S/p Aspirin 324 mg PO once.   [] History of cardiac arrhythmia: Noted to have history of atrial fibrillation from notes from 2013. Currently sinus rhythm, no events on telemetry     Addendum:    TTE Report:   1. The heart rate is bradycardic 50s BPM throughout the study.   2. Normal global left ventricular systolic function.   3. Left ventricular ejection fraction, by visual estimation, is 65 to 70%.   4. Calculated LVEF by Simpsons Biplane Method 65%. Normal left ventricle average global longitudinal strain.   5. There is mild septal left ventricular hypertrophy.   6. Normal left ventricular internal cavity size.   7. Normal right ventricular size and function.   8. The left atrium is normal in size.   9. The right atrium is normal in size.  10. Trace mitral valve regurgitation.  11. The aortic valve is not well visualized, appears trileaflet. No   aortic valve stenosis.  12. There is no evidence of pericardial effusion.    Nuclear stress test results:  IMPRESSION:  Exercise SPECT Myocardial Perfusion Imaging revealed diaphragmatic attenuation artifact, otherwise no evidence of reversible or fixed perfusion defects.  Normal left ventricular ejection fraction of 63 % (normal: 50% or   greater).  No regional wall motion abnormalities.    Echo consistent with normal left and right ventricle systolic function, no significant valvular disease. Nuclear stress test with diaphragmatic attenuation artifact, otherwise no perfusion abnormalities. Patient may be discharged with outpatient cardiology follow-up for further risk stratification. Would benefit from outpatient cardiac event monitor to ensure no recurrent atrial arrhythmia. Results discussed with patient and wife.       Agnieszka Nova MD  Cardiology

## 2022-11-02 NOTE — DISCHARGE NOTE PROVIDER - NSDCCPCAREPLAN_GEN_ALL_CORE_FT
PRINCIPAL DISCHARGE DIAGNOSIS  Diagnosis: Chest pain  Assessment and Plan of Treatment: You were admitted for chest pain.   You were evaluated for possible underlying coronary artery disease with EKGs, labs, echocardiogram and a stress test which did not reveal and evidence of underlying coronary artery disease.  Your chest pain is likely related to stress.    Please make sure to follow up with your primary care physician Dr. Durham/ANITA Balbuena.  Discharging Provider:  Koko Seaman MD  Contact Info: Cell [phone] - Please call with any questions or concerns.  Outpatient Provider:  ANITA Garcia

## 2022-11-02 NOTE — DIETITIAN INITIAL EVALUATION ADULT - ADD RECOMMEND
1) Monitor Weights, Intake, Tolerance, Skin & Labwork  2) Nutrition Education Provided on DASH-TLC Diet   3) Continue Nutrition Plan of Care

## 2022-11-02 NOTE — DISCHARGE NOTE NURSING/CASE MANAGEMENT/SOCIAL WORK - NSDCPEFALRISK_GEN_ALL_CORE
For information on Fall & Injury Prevention, visit: [email or web address] OR  [email or web address] OR  https://www.cdc.gov/steadi/patient.html

## 2022-11-02 NOTE — PROGRESS NOTE ADULT - SUBJECTIVE AND OBJECTIVE BOX
Patient is a 51y old  Male who presents with a chief complaint of chest pain (02 Nov 2022 08:40)    Feels okay.  No continued chest pain.      Patient seen and examined at bedside. No overnight events reported.     ALLERGIES:  No Known Allergies    MEDICATIONS  (STANDING):  aspirin  chewable 81 milliGRAM(s) Oral daily    MEDICATIONS  (PRN):  acetaminophen     Tablet .. 650 milliGRAM(s) Oral every 6 hours PRN Temp greater or equal to 38C (100.4F), Mild Pain (1 - 3)  aluminum hydroxide/magnesium hydroxide/simethicone Suspension 30 milliLiter(s) Oral every 4 hours PRN Dyspepsia  melatonin 3 milliGRAM(s) Oral at bedtime PRN Insomnia  ondansetron Injectable 4 milliGRAM(s) IV Push every 8 hours PRN Nausea and/or Vomiting    Vital Signs Last 24 Hrs  T(F): 98 (02 Nov 2022 05:00), Max: 98.6 (01 Nov 2022 12:52)  HR: 63 (02 Nov 2022 05:00) (60 - 63)  BP: 118/77 (02 Nov 2022 05:00) (116/75 - 121/76)  RR: 16 (02 Nov 2022 05:00) (16 - 19)  SpO2: 96% (02 Nov 2022 05:00) (96% - 98%)  I&O's Summary    PHYSICAL EXAM:  General: NAD, A/O x 3  ENT: No gross hearing impairment, Moist mucous membranes, no thrush  Neck: Supple, No JVD  Lungs: Clear to auscultation bilaterally, good air entry, non-labored breathing  Cardio: RRR, S1/S2, No murmur  Abdomen: Soft, Nontender, Nondistended; Bowel sounds present  Extremities: No calf tenderness, No cyanosis, No pitting edema  Psych: Appropriate mood and affect    LABS:             15.1   5.97  )-----------( 153      ( 02 Nov 2022 06:30 )             44.2     11-02  139  |  105  |  15  ----------------------------<  165  3.6   |  24  |  1.35    Ca    8.6      02 Nov 2022 06:30    TPro  7.8  /  Alb  4.0  /  TBili  0.6  /  DBili  x   /  AST  44  /  ALT  98  /  AlkPhos  125  11-01    CARDIAC MARKERS ( 01 Nov 2022 14:38 )  x     / 5.8 ng/L / x     / x     / x      CARDIAC MARKERS ( 01 Nov 2022 13:10 )  x     / 5.0 ng/L / x     / x     / x        11-02 Chol 177 mg/dL LDL -- HDL 33 mg/dL Trig 201 mg/dL    COVID-19 PCR: NotDetec (11-01-22 @ 16:15)    RADIOLOGY & ADDITIONAL TESTS:    Care Discussed with Consultants/Other Providers: 
SARAN ESCOBAR  [id number]      Chief Complaint: Chest pain     Interval History: The patient denies recurrent chest discomfort or shortness of breath.     Tele: sinus rhythm 60s BPM      Current meds:   acetaminophen     Tablet .. 650 milliGRAM(s) Oral every 6 hours PRN  aluminum hydroxide/magnesium hydroxide/simethicone Suspension 30 milliLiter(s) Oral every 4 hours PRN  aspirin  chewable 81 milliGRAM(s) Oral daily  melatonin 3 milliGRAM(s) Oral at bedtime PRN  ondansetron Injectable 4 milliGRAM(s) IV Push every 8 hours PRN      Objective:     Vital Signs:   T(C): 36.7 (11-02-22 @ 05:00), Max: 37 (11-01-22 @ 12:52)  HR: 63 (11-02-22 @ 05:00) (60 - 63)  BP: 118/77 (11-02-22 @ 05:00) (116/75 - 121/76)  RR: 16 (11-02-22 @ 05:00) (16 - 19)  SpO2: 96% (11-02-22 @ 05:00) (96% - 98%)  Wt(kg): --      PHYSICAL EXAM:  General: no acute distress  HEENT: sclera anicteric  Neck: supple  CVS: JVP ~ 7 cm H20, RRR, s1, s2, no murmurs/rubs/gallops  Pulm: unlabored respirations, clear to auscultation b/l  Abdomen: non-distended  Extremities: no lower extremity edema b/l  Neuro: awake, alert & oriented x 3  Psych: normal affect    Labs:   02 Nov 2022 06:30    139    |  105    |  15     ----------------------------<  165    3.6     |  24     |  1.35     Ca    8.6        02 Nov 2022 06:30    TPro  7.8    /  Alb  4.0    /  TBili  0.6    /  DBili  x      /  AST  44     /  ALT  98     /  AlkPhos  125    01 Nov 2022 13:10                          15.1   5.97  )-----------( 153      ( 02 Nov 2022 06:30 )             44.2             Coronary angiogram (2013):  LM: normal  LAD: normal  LCX: normal  RCA: normal     ECG (7/26/19): sinus rhythm, inferior T wave inversions, anterolateral ST depressions    ECG (11/11/22): sinus rhythm, inferior T wave inversions, anteroseptal T wave inversions and ST depressions (overall similar to old ECG)

## 2022-11-02 NOTE — DIETITIAN INITIAL EVALUATION ADULT - PERTINENT MEDS FT
MEDICATIONS  (STANDING):  aspirin  chewable 81 milliGRAM(s) Oral daily    MEDICATIONS  (PRN):  acetaminophen     Tablet .. 650 milliGRAM(s) Oral every 6 hours PRN Temp greater or equal to 38C (100.4F), Mild Pain (1 - 3)  aluminum hydroxide/magnesium hydroxide/simethicone Suspension 30 milliLiter(s) Oral every 4 hours PRN Dyspepsia  melatonin 3 milliGRAM(s) Oral at bedtime PRN Insomnia  ondansetron Injectable 4 milliGRAM(s) IV Push every 8 hours PRN Nausea and/or Vomiting

## 2022-11-02 NOTE — DISCHARGE NOTE PROVIDER - NSDCFUSCHEDAPPT_GEN_ALL_CORE_FT
Agnieszka Nova  [facility] Physician [facility]  CARDIOLOGY [address]  Scheduled Appointment: 11/03/2022    Sam Vick  [facility] Physician [facility]  ORTHOSURG [address]  Scheduled Appointment: 11/03/2022    Donny Balbuena  [facility] Physician [facility]  [address]  Scheduled Appointment: 11/09/2022

## 2022-11-02 NOTE — DIETITIAN INITIAL EVALUATION ADULT - REASON INDICATOR FOR ASSESSMENT
Initial Assessment - Medical Consult - 24hr - Education    Patient Speaks Moroccan, RD is Fluent in Moroccan

## 2022-11-02 NOTE — PROGRESS NOTE ADULT - ASSESSMENT
51M with no significant PMH presents with intermittent left chest pain for the past week. Admitted for chest pain r/o ACS.    Chest Pain   Ruled out STEMI/NSTEMI  - for stress test today  - cont ASA 81mg daily  - Trend cardiac enzymes, negative x 2  - Check TTE   - Discussed with cardio Dr. Nova, plan for stress test in AM    History of Afib    #Prophylactic Measure  -DVT ppx: SCDs    Will update wife.

## 2022-11-02 NOTE — DISCHARGE NOTE PROVIDER - CARE PROVIDERS DIRECT ADDRESSES
,[email or web address]

## 2022-11-02 NOTE — DIETITIAN INITIAL EVALUATION ADULT - NS FNS DIET ORDER
on DASH-TLC Diet w/ Thin Liquids (IDDSI Level 0)  Nutrition Education Provided on DASH-TLC Diet   Written Diet Education Provided to Patient.

## 2022-11-02 NOTE — DISCHARGE NOTE NURSING/CASE MANAGEMENT/SOCIAL WORK - PATIENT PORTAL LINK FT
You can access the FollowMyHealth Patient Portal offered by [facility] by registering at the following website: [email or web address]. By joining [employer]’s FollowMyHealth portal, you will also be able to view your health information using other applications (apps) compatible with our system.

## 2022-11-02 NOTE — DIETITIAN INITIAL EVALUATION ADULT - NSFNSNUTRCHEWSWALLOWFT_GEN_A_CORE
Tolerates Diet Consistency Well  Tolerates Fluid Consistency Well  No Chewing/Swallowing Difficulties (Per Patient)

## 2022-11-02 NOTE — DIETITIAN INITIAL EVALUATION ADULT - PERTINENT LABORATORY DATA
11-02    139  |  105  |  15  ----------------------------<  165<H>  3.6   |  24  |  1.35<H>    Ca    8.6      02 Nov 2022 06:30    TPro  7.8  /  Alb  4.0  /  TBili  0.6  /  DBili  x   /  AST  44<H>  /  ALT  98<H>  /  AlkPhos  125<H>  11-01

## 2022-11-03 ENCOUNTER — APPOINTMENT (OUTPATIENT)
Dept: CARDIOLOGY | Facility: CLINIC | Age: 52
End: 2022-11-03

## 2022-11-07 ENCOUNTER — NON-APPOINTMENT (OUTPATIENT)
Age: 52
End: 2022-11-07

## 2022-11-09 ENCOUNTER — APPOINTMENT (OUTPATIENT)
Dept: FAMILY MEDICINE | Facility: CLINIC | Age: 52
End: 2022-11-09

## 2022-11-14 ENCOUNTER — APPOINTMENT (OUTPATIENT)
Dept: ORTHOPEDIC SURGERY | Facility: CLINIC | Age: 52
End: 2022-11-14

## 2023-01-20 ENCOUNTER — APPOINTMENT (OUTPATIENT)
Dept: ORTHOPEDIC SURGERY | Facility: CLINIC | Age: 53
End: 2023-01-20

## 2023-01-23 ENCOUNTER — APPOINTMENT (OUTPATIENT)
Dept: ORTHOPEDIC SURGERY | Facility: CLINIC | Age: 53
End: 2023-01-23

## 2023-03-13 NOTE — ED PROVIDER NOTE - CONSTITUTIONAL NEGATIVE STATEMENT, MLM
Onset of sore throat last night. No fever. Patient is 31 weeks pregnant. Children with same symptoms.
no fever and no chills.

## 2023-03-20 ENCOUNTER — OFFICE (OUTPATIENT)
Facility: LOCATION | Age: 53
Setting detail: OPHTHALMOLOGY
End: 2023-03-20

## 2023-03-20 DIAGNOSIS — H52.13: ICD-10-CM

## 2023-03-20 PROCEDURE — SCREF LASIK EVAL: Performed by: OPHTHALMOLOGY

## 2023-03-20 ASSESSMENT — KERATOMETRY
OS_AXISANGLE2_DEGREES: 071
OS_K2POWER_DIOPTERS: 45.00
OD_AXISANGLE2_DEGREES: 102
OS_K1POWER_DIOPTERS: 43.00
OD_K1POWER_DIOPTERS: 43.25
OS_CYLAXISANGLE_DEGREES: 071
OD_CYLPOWER_DEGREES: 1.5
OD_AXISANGLE_DEGREES: 12
OS_K1K2_AVERAGE: 44
OD_K2POWER_DIOPTERS: 44.75
OS_CYLPOWER_DEGREES: 2
OD_K1K2_AVERAGE: 44
OS_AXISANGLE_DEGREES: 161
OD_CYLAXISANGLE_DEGREES: 102

## 2023-03-20 ASSESSMENT — CONFRONTATIONAL VISUAL FIELD TEST (CVF)
OS_FINDINGS: FULL
OD_FINDINGS: FULL

## 2023-03-27 ENCOUNTER — OFFICE (OUTPATIENT)
Facility: LOCATION | Age: 53
Setting detail: OPHTHALMOLOGY
End: 2023-03-27
Payer: MEDICAID

## 2023-03-27 DIAGNOSIS — H44.23: ICD-10-CM

## 2023-03-27 PROCEDURE — SCREF LASIK EVAL: Performed by: OPHTHALMOLOGY

## 2023-03-27 ASSESSMENT — KERATOMETRY
OS_AXISANGLE_DEGREES: 158
OD_AXISANGLE_DEGREES: 17
OD_K1POWER_DIOPTERS: 43.25
OD_CYLAXISANGLE_DEGREES: 107
OS_CYLAXISANGLE_DEGREES: 068
OD_CYLPOWER_DEGREES: 1.5
OD_K2POWER_DIOPTERS: 44.75
OS_K1POWER_DIOPTERS: 42.75
OS_AXISANGLE2_DEGREES: 068
OD_AXISANGLE2_DEGREES: 107
OS_K1K2_AVERAGE: 43.625
OS_CYLPOWER_DEGREES: 1.75
OS_K2POWER_DIOPTERS: 44.50
OD_K1K2_AVERAGE: 44

## 2023-03-27 ASSESSMENT — CONFRONTATIONAL VISUAL FIELD TEST (CVF)
OD_FINDINGS: FULL
OS_FINDINGS: FULL

## 2023-03-30 PROBLEM — H52.13 LASIK EVALUATION TODAY; BOTH EYES: Status: ACTIVE | Noted: 2023-03-20

## 2023-03-30 ASSESSMENT — AXIALLENGTH_DERIVED
OS_AL: 30.13
OD_AL: 31.12
OD_AL: 31.55
OS_AL: 30.77
OS_AL: 29.9
OD_AL: 31.38

## 2023-03-30 ASSESSMENT — KERATOMETRY
OD_K1POWER_DIOPTERS: 43.25
OS_K1POWER_DIOPTERS: 43.00
OD_AXISANGLE_DEGREES: 107
OD_AXISANGLE_DEGREES: 102
OD_K2POWER_DIOPTERS: 44.75
OD_K1POWER_DIOPTERS: 43.25
OS_K2POWER_DIOPTERS: 45.00
OS_K2POWER_DIOPTERS: 44.50
OS_AXISANGLE_DEGREES: 071
OS_AXISANGLE_DEGREES: 068
OD_K2POWER_DIOPTERS: 44.75
OS_K1POWER_DIOPTERS: 42.75

## 2023-03-30 ASSESSMENT — VISUAL ACUITY
OS_BCVA: 20/40-2
OS_BCVA: 20/40-2
OD_BCVA: 20/25-2
OD_BCVA: 20/40

## 2023-03-30 ASSESSMENT — SPHEQUIV_DERIVED
OD_SPHEQUIV: -15.125
OD_SPHEQUIV: -15.5
OS_SPHEQUIV: -13.25
OS_SPHEQUIV: -14.25
OS_SPHEQUIV: -13.25
OD_SPHEQUIV: -15.75

## 2023-03-30 ASSESSMENT — REFRACTION_AUTOREFRACTION
OD_SPHERE: -14.50
OD_SPHERE: -14.75
OS_SPHERE: -12.25
OS_AXIS: 144
OD_CYLINDER: -2.00
OS_AXIS: 153
OS_SPHERE: -12.00
OS_CYLINDER: -2.00
OD_CYLINDER: -2.00
OD_AXIS: 034
OD_AXIS: 030
OS_CYLINDER: -2.50

## 2023-03-30 ASSESSMENT — REFRACTION_MANIFEST
OD_CYLINDER: +1.75
OS_AXIS: 060
OD_SPHERE: -16.00
OD_AXIS: 120
OD_VA1: 20/30
OS_CYLINDER: +1.50
OS_VA1: 20/40
OS_SPHERE: -15.00

## 2023-05-16 ENCOUNTER — NON-APPOINTMENT (OUTPATIENT)
Age: 53
End: 2023-05-16

## 2023-05-16 ENCOUNTER — APPOINTMENT (OUTPATIENT)
Dept: FAMILY MEDICINE | Facility: CLINIC | Age: 53
End: 2023-05-16
Payer: MEDICAID

## 2023-05-16 VITALS
SYSTOLIC BLOOD PRESSURE: 104 MMHG | WEIGHT: 213 LBS | RESPIRATION RATE: 16 BRPM | OXYGEN SATURATION: 97 % | HEIGHT: 69 IN | DIASTOLIC BLOOD PRESSURE: 72 MMHG | TEMPERATURE: 97.6 F | BODY MASS INDEX: 31.55 KG/M2 | HEART RATE: 65 BPM

## 2023-05-16 DIAGNOSIS — Z01.84 ENCOUNTER FOR ANTIBODY RESPONSE EXAMINATION: ICD-10-CM

## 2023-05-16 DIAGNOSIS — I25.10 ATHEROSCLEROTIC HEART DISEASE OF NATIVE CORONARY ARTERY W/OUT ANGINA PECTORIS: ICD-10-CM

## 2023-05-16 DIAGNOSIS — Z00.00 ENCOUNTER FOR GENERAL ADULT MEDICAL EXAMINATION W/OUT ABNORMAL FINDINGS: ICD-10-CM

## 2023-05-16 DIAGNOSIS — Z12.5 ENCOUNTER FOR SCREENING FOR MALIGNANT NEOPLASM OF PROSTATE: ICD-10-CM

## 2023-05-16 DIAGNOSIS — E66.9 OBESITY, UNSPECIFIED: ICD-10-CM

## 2023-05-16 DIAGNOSIS — Z11.1 ENCOUNTER FOR SCREENING FOR RESPIRATORY TUBERCULOSIS: ICD-10-CM

## 2023-05-16 DIAGNOSIS — E55.9 VITAMIN D DEFICIENCY, UNSPECIFIED: ICD-10-CM

## 2023-05-16 DIAGNOSIS — E66.3 OVERWEIGHT: ICD-10-CM

## 2023-05-16 DIAGNOSIS — R79.89 OTHER SPECIFIED ABNORMAL FINDINGS OF BLOOD CHEMISTRY: ICD-10-CM

## 2023-05-16 PROCEDURE — T1013: CPT

## 2023-05-16 PROCEDURE — 93000 ELECTROCARDIOGRAM COMPLETE: CPT

## 2023-05-16 PROCEDURE — 99213 OFFICE O/P EST LOW 20 MIN: CPT | Mod: 25

## 2023-05-16 PROCEDURE — 99396 PREV VISIT EST AGE 40-64: CPT | Mod: 25

## 2023-05-16 RX ORDER — ASPIRIN 81 MG
81 TABLET, DELAYED RELEASE (ENTERIC COATED) ORAL
Refills: 0 | Status: ACTIVE | COMMUNITY

## 2023-05-16 NOTE — INTERPRETER SERVICES
[[employer] ] : provided by [employer]   [Time Spent: ____ minutes] : Total time spent using  services: [unfilled] minutes. The patient's primary language is not English thus required  services. [Interpreters_IDNumber] : [id number] [TWNoteComboBox1] : Gambian

## 2023-05-16 NOTE — HISTORY OF PRESENT ILLNESS
[FreeTextEntry1] : see HPI [de-identified] : CPE and comprehensive blood work. He is AAOX3,NAD. 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 He is from [place] and speaks Icelandic. Patient did not see any specialist I referred him to last year. denies sleep apnea. He denies h/o positive PPD. no respiratory symptoms.

## 2023-05-16 NOTE — PLAN
[FreeTextEntry1] : weight loss, healthy diet and exercise. labs . \par HCP forms advised to fill and bring a copy.\par low chol. diet. avoid conc. sweets.\par weight loss.\par f/u urology. EKG reviewed. obtain cardiology records. f/u cardiology.

## 2023-05-16 NOTE — HEALTH RISK ASSESSMENT
[Good] : ~his/her~  mood as  good [No] : In the past 12 months have you used drugs other than those required for medical reasons? No [No falls in past year] : Patient reported no falls in the past year [0] : 2) Feeling down, depressed, or hopeless: Not at all (0) [PHQ-2 Negative - No further assessment needed] : PHQ-2 Negative - No further assessment needed [No Retinopathy] : No retinopathy [HIV test declined] : HIV test declined [Hepatitis C test declined] : Hepatitis C test declined [None] : None [With Family] : lives with family [Employed] : employed [] :  [Sexually Active] : sexually active [Feels Safe at Home] : Feels safe at home [Fully functional (bathing, dressing, toileting, transferring, walking, feeding)] : Fully functional (bathing, dressing, toileting, transferring, walking, feeding) [Fully functional (using the telephone, shopping, preparing meals, housekeeping, doing laundry, using] : Fully functional and needs no help or supervision to perform IADLs (using the telephone, shopping, preparing meals, housekeeping, doing laundry, using transportation, managing medications and managing finances) [Independent] : managing finances [Reports normal functional visual acuity (ie: able to read med bottle)] : Reports normal functional visual acuity [Smoke Detector] : smoke detector [Carbon Monoxide Detector] : carbon monoxide detector [Seat Belt] :  uses seat belt [Sunscreen] : uses sunscreen [With Patient/Caregiver] : , with patient/caregiver [Never] : Never [> 15 Years] : > 15 Years [de-identified] : soccer, walks regularly  [de-identified] : regular [DCH6Hydii] : 0 [EyeExamDate] : 04/23 [Change in mental status noted] : No change in mental status noted [Language] : denies difficulty with language [Handling Complex Tasks] : denies difficulty handling complex tasks [Reports changes in hearing] : Reports no changes in hearing [Reports changes in vision] : Reports no changes in vision [Reports changes in dental health] : Reports no changes in dental health [ColonoscopyComments] : ordered [AdvancecareDate] : 05/23 [de-identified] : quit 30 years ago

## 2023-05-16 NOTE — PHYSICAL EXAM
[No Acute Distress] : no acute distress [Well Nourished] : well nourished [Well Developed] : well developed [Well-Appearing] : well-appearing [Normal Sclera/Conjunctiva] : normal sclera/conjunctiva [PERRL] : pupils equal round and reactive to light [EOMI] : extraocular movements intact [Normal Outer Ear/Nose] : the outer ears and nose were normal in appearance [Normal Oropharynx] : the oropharynx was normal [No JVD] : no jugular venous distention [No Lymphadenopathy] : no lymphadenopathy [Supple] : supple [Thyroid Normal, No Nodules] : the thyroid was normal and there were no nodules present [No Respiratory Distress] : no respiratory distress  [No Accessory Muscle Use] : no accessory muscle use [Clear to Auscultation] : lungs were clear to auscultation bilaterally [Normal Rate] : normal rate  [Regular Rhythm] : with a regular rhythm [Normal S1, S2] : normal S1 and S2 [No Murmur] : no murmur heard [No Carotid Bruits] : no carotid bruits [No Abdominal Bruit] : a ~M bruit was not heard ~T in the abdomen [No Varicosities] : no varicosities [Pedal Pulses Present] : the pedal pulses are present [No Edema] : there was no peripheral edema [No Palpable Aorta] : no palpable aorta [No Extremity Clubbing/Cyanosis] : no extremity clubbing/cyanosis [Soft] : abdomen soft [Non Tender] : non-tender [Non-distended] : non-distended [No Masses] : no abdominal mass palpated [No HSM] : no HSM [Normal Bowel Sounds] : normal bowel sounds [No CVA Tenderness] : no CVA  tenderness [No Spinal Tenderness] : no spinal tenderness [No Joint Swelling] : no joint swelling [Grossly Normal Strength/Tone] : grossly normal strength/tone [No Rash] : no rash [No Focal Deficits] : no focal deficits [Coordination Grossly Intact] : coordination grossly intact [Normal Gait] : normal gait [Deep Tendon Reflexes (DTR)] : deep tendon reflexes were 2+ and symmetric [Normal Affect] : the affect was normal [Normal Insight/Judgement] : insight and judgment were intact [de-identified] : defer to urology

## 2023-05-23 ENCOUNTER — NON-APPOINTMENT (OUTPATIENT)
Age: 53
End: 2023-05-23

## 2023-05-23 ENCOUNTER — APPOINTMENT (OUTPATIENT)
Dept: CARDIOLOGY | Facility: CLINIC | Age: 53
End: 2023-05-23
Payer: MEDICAID

## 2023-05-23 VITALS
BODY MASS INDEX: 31.4 KG/M2 | SYSTOLIC BLOOD PRESSURE: 120 MMHG | WEIGHT: 212 LBS | HEART RATE: 58 BPM | HEIGHT: 69 IN | DIASTOLIC BLOOD PRESSURE: 84 MMHG | OXYGEN SATURATION: 97 %

## 2023-05-23 VITALS — SYSTOLIC BLOOD PRESSURE: 120 MMHG | HEART RATE: 60 BPM | DIASTOLIC BLOOD PRESSURE: 89 MMHG

## 2023-05-23 DIAGNOSIS — R94.31 ABNORMAL ELECTROCARDIOGRAM [ECG] [EKG]: ICD-10-CM

## 2023-05-23 DIAGNOSIS — E78.6 LIPOPROTEIN DEFICIENCY: ICD-10-CM

## 2023-05-23 DIAGNOSIS — R53.83 OTHER FATIGUE: ICD-10-CM

## 2023-05-23 LAB
25(OH)D3 SERPL-MCNC: 15.7 NG/ML
ALBUMIN SERPL ELPH-MCNC: 4.4 G/DL
ALP BLD-CCNC: 136 U/L
ALT SERPL-CCNC: 68 U/L
ANION GAP SERPL CALC-SCNC: 13 MMOL/L
APPEARANCE: CLEAR
AST SERPL-CCNC: 35 U/L
BASOPHILS # BLD AUTO: 0.05 K/UL
BASOPHILS NFR BLD AUTO: 0.9 %
BILIRUB SERPL-MCNC: 0.5 MG/DL
BILIRUBIN URINE: NEGATIVE
BLOOD URINE: NEGATIVE
BUN SERPL-MCNC: 17 MG/DL
CALCIUM SERPL-MCNC: 9.3 MG/DL
CHLORIDE SERPL-SCNC: 108 MMOL/L
CHOLEST SERPL-MCNC: 191 MG/DL
CO2 SERPL-SCNC: 23 MMOL/L
COLOR: YELLOW
CREAT SERPL-MCNC: 1 MG/DL
EGFR: 91 ML/MIN/1.73M2
EOSINOPHIL # BLD AUTO: 0.08 K/UL
EOSINOPHIL NFR BLD AUTO: 1.5 %
ESTIMATED AVERAGE GLUCOSE: 123 MG/DL
GGT SERPL-CCNC: 488 U/L
GLUCOSE QUALITATIVE U: NEGATIVE MG/DL
GLUCOSE SERPL-MCNC: 121 MG/DL
HBA1C MFR BLD HPLC: 5.9 %
HBV SURFACE AB SER QL: NONREACTIVE
HCT VFR BLD CALC: 46.8 %
HCV AB SER QL: NONREACTIVE
HCV S/CO RATIO: 0.13 S/CO
HCYS SERPL-MCNC: 13.6 UMOL/L
HDLC SERPL-MCNC: 38 MG/DL
HGB BLD-MCNC: 15.4 G/DL
HIV1+2 AB SPEC QL IA.RAPID: NONREACTIVE
IMM GRANULOCYTES NFR BLD AUTO: 0.2 %
KETONES URINE: NEGATIVE MG/DL
LDLC SERPL CALC-MCNC: 94 MG/DL
LEUKOCYTE ESTERASE URINE: NEGATIVE
LYMPHOCYTES # BLD AUTO: 1.84 K/UL
LYMPHOCYTES NFR BLD AUTO: 34.8 %
M TB IFN-G BLD-IMP: POSITIVE
MAN DIFF?: NORMAL
MCHC RBC-ENTMCNC: 30 PG
MCHC RBC-ENTMCNC: 32.9 GM/DL
MCV RBC AUTO: 91.1 FL
MEV IGG FLD QL IA: >300 AU/ML
MEV IGG+IGM SER-IMP: POSITIVE
MONOCYTES # BLD AUTO: 0.48 K/UL
MONOCYTES NFR BLD AUTO: 9.1 %
MUV AB SER-ACNC: POSITIVE
MUV IGG SER QL IA: >300 AU/ML
NEUTROPHILS # BLD AUTO: 2.83 K/UL
NEUTROPHILS NFR BLD AUTO: 53.5 %
NITRITE URINE: NEGATIVE
NONHDLC SERPL-MCNC: 153 MG/DL
PH URINE: 6
PLATELET # BLD AUTO: 136 K/UL
POTASSIUM SERPL-SCNC: 4.1 MMOL/L
PROT SERPL-MCNC: 7.5 G/DL
PROTEIN URINE: NEGATIVE MG/DL
PSA SERPL-MCNC: 2.52 NG/ML
QUANTIFERON TB PLUS MITOGEN MINUS NIL: >10 IU/ML
QUANTIFERON TB PLUS NIL: 0.08 IU/ML
QUANTIFERON TB PLUS TB1 MINUS NIL: 4.32 IU/ML
QUANTIFERON TB PLUS TB2 MINUS NIL: 3.83 IU/ML
RBC # BLD: 5.14 M/UL
RBC # FLD: 13.3 %
RUBV IGG FLD-ACNC: 10 INDEX
RUBV IGG SER-IMP: POSITIVE
SODIUM SERPL-SCNC: 143 MMOL/L
SPECIFIC GRAVITY URINE: 1.02
TRIGL SERPL-MCNC: 296 MG/DL
TSH SERPL-ACNC: 4.5 UIU/ML
URATE SERPL-MCNC: 5.8 MG/DL
UROBILINOGEN URINE: 1 MG/DL
WBC # FLD AUTO: 5.29 K/UL

## 2023-05-23 PROCEDURE — 93000 ELECTROCARDIOGRAM COMPLETE: CPT

## 2023-05-23 PROCEDURE — 99214 OFFICE O/P EST MOD 30 MIN: CPT | Mod: 25

## 2023-05-23 NOTE — CARDIOLOGY SUMMARY
[de-identified] : 5/23/2023\par Sinus rhythm with T wave inversions in leads III and aVF.  These have been seen on prior EKGs dating back to 2015

## 2023-05-23 NOTE — ASSESSMENT
[FreeTextEntry1] : In summary, the patient is a 52-year-old man with a chronically abnormal EKG in the inferior leads, with a low HDL cholesterol and with concern over heart disease as well as fatigue with exertion.\par \par I have advised the patient to return for echocardiography to assess him for any type of myopathy that could be causing his fatigue as well as for exercise stress testing to more objectively assess his functional capacity

## 2023-05-23 NOTE — REASON FOR VISIT
[Other: ____] : [unfilled] [FreeTextEntry1] : This is a 52-year-old man who presents for cardiac assessment.  The patient according to his wife gets fatigued with most physical activity.  He occasionally feels as if "his heart is bothering him".  Of note is the fact that he underwent coronary angiography 10 years ago which revealed completely normal arteries.  The patient has no history of hypertension or diabetes.  He drinks at the most 1 cup of wine per day.  He was a smoker for about 2 years when he was in his late teens but has not smoked since.  He has no known family history of coronary artery disease.  He states that his father had a heart murmur and had liver disease.  Most recent lipid profile reveals a triglycerides of 276 a total cholesterol 191 HDL 38 LDL 94.

## 2023-05-24 ENCOUNTER — APPOINTMENT (OUTPATIENT)
Dept: FAMILY MEDICINE | Facility: CLINIC | Age: 53
End: 2023-05-24
Payer: MEDICAID

## 2023-05-24 VITALS
WEIGHT: 212 LBS | BODY MASS INDEX: 31.4 KG/M2 | DIASTOLIC BLOOD PRESSURE: 80 MMHG | HEART RATE: 56 BPM | TEMPERATURE: 96.6 F | RESPIRATION RATE: 16 BRPM | HEIGHT: 69 IN | SYSTOLIC BLOOD PRESSURE: 117 MMHG | OXYGEN SATURATION: 98 %

## 2023-05-24 DIAGNOSIS — R76.12 NONSPECIFIC REACTION TO CELL MEDIATED IMMUNITY MEASUREMENT OF GAMMA INTERFERON ANTIGEN RESPONSE W/OUT ACTIVE TUBERCULOSIS: ICD-10-CM

## 2023-05-24 DIAGNOSIS — R73.03 PREDIABETES.: ICD-10-CM

## 2023-05-24 DIAGNOSIS — R74.8 ABNORMAL LEVELS OF OTHER SERUM ENZYMES: ICD-10-CM

## 2023-05-24 DIAGNOSIS — K76.0 FATTY (CHANGE OF) LIVER, NOT ELSEWHERE CLASSIFIED: ICD-10-CM

## 2023-05-24 PROCEDURE — 99214 OFFICE O/P EST MOD 30 MIN: CPT

## 2023-05-24 RX ORDER — CHOLECALCIFEROL (VITAMIN D3) 1250 MCG
1.25 MG CAPSULE ORAL
Qty: 12 | Refills: 0 | Status: ACTIVE | COMMUNITY
Start: 2023-05-24 | End: 1900-01-01

## 2023-05-26 ENCOUNTER — OUTPATIENT (OUTPATIENT)
Dept: OUTPATIENT SERVICES | Facility: HOSPITAL | Age: 53
LOS: 1 days | End: 2023-05-26
Payer: MEDICAID

## 2023-05-26 ENCOUNTER — APPOINTMENT (OUTPATIENT)
Dept: RADIOLOGY | Facility: HOSPITAL | Age: 53
End: 2023-05-26
Payer: MEDICAID

## 2023-05-26 ENCOUNTER — APPOINTMENT (OUTPATIENT)
Dept: SURGERY | Facility: CLINIC | Age: 53
End: 2023-05-26

## 2023-05-26 DIAGNOSIS — R76.12 NONSPECIFIC REACTION TO CELL MEDIATED IMMUNITY MEASUREMENT OF GAMMA INTERFERON ANTIGEN RESPONSE WITHOUT ACTIVE TUBERCULOSIS: ICD-10-CM

## 2023-05-26 PROCEDURE — 71046 X-RAY EXAM CHEST 2 VIEWS: CPT

## 2023-05-26 PROCEDURE — 71046 X-RAY EXAM CHEST 2 VIEWS: CPT | Mod: 26

## 2023-05-30 PROBLEM — R73.03 PREDIABETES: Status: ACTIVE | Noted: 2023-05-30

## 2023-05-30 PROBLEM — K76.0 FATTY LIVER: Status: ACTIVE | Noted: 2023-05-24

## 2023-05-30 NOTE — PHYSICAL EXAM
[No Acute Distress] : no acute distress [Well Nourished] : well nourished [Well Developed] : well developed [Well-Appearing] : well-appearing [Normal Sclera/Conjunctiva] : normal sclera/conjunctiva [PERRL] : pupils equal round and reactive to light [EOMI] : extraocular movements intact [Normal Outer Ear/Nose] : the outer ears and nose were normal in appearance [Normal Oropharynx] : the oropharynx was normal [No JVD] : no jugular venous distention [No Lymphadenopathy] : no lymphadenopathy [Supple] : supple [Thyroid Normal, No Nodules] : the thyroid was normal and there were no nodules present [No Respiratory Distress] : no respiratory distress  [No Accessory Muscle Use] : no accessory muscle use [Clear to Auscultation] : lungs were clear to auscultation bilaterally [Normal Rate] : normal rate  [Regular Rhythm] : with a regular rhythm [Normal S1, S2] : normal S1 and S2 [No Murmur] : no murmur heard [No Carotid Bruits] : no carotid bruits [No Abdominal Bruit] : a ~M bruit was not heard ~T in the abdomen [No Varicosities] : no varicosities [Pedal Pulses Present] : the pedal pulses are present [No Edema] : there was no peripheral edema [No Palpable Aorta] : no palpable aorta [No Extremity Clubbing/Cyanosis] : no extremity clubbing/cyanosis [Soft] : abdomen soft [Non Tender] : non-tender [Non-distended] : non-distended [No Masses] : no abdominal mass palpated [No HSM] : no HSM [Normal Bowel Sounds] : normal bowel sounds [No CVA Tenderness] : no CVA  tenderness [No Spinal Tenderness] : no spinal tenderness [No Joint Swelling] : no joint swelling [Grossly Normal Strength/Tone] : grossly normal strength/tone [No Rash] : no rash [Coordination Grossly Intact] : coordination grossly intact [No Focal Deficits] : no focal deficits [Normal Gait] : normal gait [Deep Tendon Reflexes (DTR)] : deep tendon reflexes were 2+ and symmetric [Normal Affect] : the affect was normal [Normal Insight/Judgement] : insight and judgment were intact [de-identified] : defer to urology

## 2023-05-30 NOTE — HEALTH RISK ASSESSMENT
[Former] : Former [No] : In the past 12 months have you used drugs other than those required for medical reasons? No [No falls in past year] : Patient reported no falls in the past year [0] : 2) Feeling down, depressed, or hopeless: Not at all (0) [PHQ-2 Negative - No further assessment needed] : PHQ-2 Negative - No further assessment needed [With Patient/Caregiver] : , with patient/caregiver [de-identified] : regular [[id number]] : 0 [AdvancecareDate] : 05/23

## 2023-05-30 NOTE — HISTORY OF PRESENT ILLNESS
[FreeTextEntry1] : see HPI [de-identified] : Here with wife  to follow up fatty liver, abnormal test results. He had BCG vaccine in [place]. denies respiratory symptoms or fever. He had CXR in 11/2022 however recent Quantiferoin Gold test is positive. No recent travel or sick contact with TB.

## 2023-06-13 ENCOUNTER — APPOINTMENT (OUTPATIENT)
Dept: CARDIOLOGY | Facility: CLINIC | Age: 53
End: 2023-06-13

## 2023-06-22 ENCOUNTER — APPOINTMENT (OUTPATIENT)
Dept: CARDIOLOGY | Facility: CLINIC | Age: 53
End: 2023-06-22

## 2024-06-25 DIAGNOSIS — N52.9 MALE ERECTILE DYSFUNCTION, UNSPECIFIED: ICD-10-CM

## 2024-06-25 RX ORDER — SILDENAFIL 50 MG/1
50 TABLET ORAL
Qty: 18 | Refills: 3 | Status: ACTIVE | COMMUNITY
Start: 2024-06-25

## 2024-07-10 ENCOUNTER — APPOINTMENT (OUTPATIENT)
Dept: FAMILY MEDICINE | Facility: CLINIC | Age: 54
End: 2024-07-10
Payer: MEDICAID

## 2024-07-10 ENCOUNTER — NON-APPOINTMENT (OUTPATIENT)
Age: 54
End: 2024-07-10

## 2024-07-10 VITALS
OXYGEN SATURATION: 99 % | HEIGHT: 69 IN | RESPIRATION RATE: 16 BRPM | WEIGHT: 218 LBS | HEART RATE: 68 BPM | BODY MASS INDEX: 32.29 KG/M2 | SYSTOLIC BLOOD PRESSURE: 120 MMHG | DIASTOLIC BLOOD PRESSURE: 75 MMHG | TEMPERATURE: 97.2 F

## 2024-07-10 DIAGNOSIS — R74.8 ABNORMAL LEVELS OF OTHER SERUM ENZYMES: ICD-10-CM

## 2024-07-10 DIAGNOSIS — E55.9 VITAMIN D DEFICIENCY, UNSPECIFIED: ICD-10-CM

## 2024-07-10 DIAGNOSIS — R53.83 OTHER FATIGUE: ICD-10-CM

## 2024-07-10 DIAGNOSIS — R76.12 NONSPECIFIC REACTION TO CELL MEDIATED IMMUNITY MEASUREMENT OF GAMMA INTERFERON ANTIGEN RESPONSE W/OUT ACTIVE TUBERCULOSIS: ICD-10-CM

## 2024-07-10 DIAGNOSIS — R73.03 PREDIABETES.: ICD-10-CM

## 2024-07-10 DIAGNOSIS — K76.0 FATTY (CHANGE OF) LIVER, NOT ELSEWHERE CLASSIFIED: ICD-10-CM

## 2024-07-10 DIAGNOSIS — Z00.00 ENCOUNTER FOR GENERAL ADULT MEDICAL EXAMINATION W/OUT ABNORMAL FINDINGS: ICD-10-CM

## 2024-07-10 DIAGNOSIS — E66.9 OBESITY, UNSPECIFIED: ICD-10-CM

## 2024-07-10 PROCEDURE — 99396 PREV VISIT EST AGE 40-64: CPT

## 2024-07-10 PROCEDURE — 93000 ELECTROCARDIOGRAM COMPLETE: CPT

## 2024-07-11 LAB
25(OH)D3 SERPL-MCNC: 19.6 NG/ML
ALP BLD-CCNC: 140 U/L
ALT SERPL-CCNC: 122 U/L
ANION GAP SERPL CALC-SCNC: 14 MMOL/L
APPEARANCE: CLEAR
AST SERPL-CCNC: 65 U/L
BASOPHILS # BLD AUTO: 0.03 K/UL
BASOPHILS NFR BLD AUTO: 0.6 %
BILIRUB SERPL-MCNC: 0.5 MG/DL
BLOOD URINE: NEGATIVE
BUN SERPL-MCNC: 16 MG/DL
CALCIUM SERPL-MCNC: 10.1 MG/DL
CHLORIDE SERPL-SCNC: 103 MMOL/L
CHOLEST SERPL-MCNC: 220 MG/DL
CO2 SERPL-SCNC: 22 MMOL/L
COLOR: YELLOW
CREAT SERPL-MCNC: 1.06 MG/DL
EGFR: 84 ML/MIN/1.73M2
EOSINOPHIL NFR BLD AUTO: 1.5 %
ESTIMATED AVERAGE GLUCOSE: 120 MG/DL
FOLATE SERPL-MCNC: 14.5 NG/ML
GGT SERPL-CCNC: 626 U/L
GLUCOSE QUALITATIVE U: NEGATIVE MG/DL
GLUCOSE SERPL-MCNC: 146 MG/DL
HBA1C MFR BLD HPLC: 5.8 %
HCT VFR BLD CALC: 50.7 %
HDLC SERPL-MCNC: 45 MG/DL
HGB BLD-MCNC: 16.5 G/DL
IMM GRANULOCYTES NFR BLD AUTO: 0.4 %
KETONES URINE: NEGATIVE MG/DL
LDLC SERPL CALC-MCNC: 131 MG/DL
LEUKOCYTE ESTERASE URINE: NEGATIVE
LYMPHOCYTES NFR BLD AUTO: 36 %
MAN DIFF?: NORMAL
MCHC RBC-ENTMCNC: 29.9 PG
MCHC RBC-ENTMCNC: 32.5 GM/DL
MCV RBC AUTO: 92 FL
MONOCYTES # BLD AUTO: 0.47 K/UL
MONOCYTES NFR BLD AUTO: 8.8 %
NEUTROPHILS # BLD AUTO: 2.83 K/UL
NEUTROPHILS NFR BLD AUTO: 52.7 %
NITRITE URINE: NEGATIVE
PH URINE: 6
PLATELET # BLD AUTO: 148 K/UL
PROT SERPL-MCNC: 8 G/DL
PROTEIN URINE: NEGATIVE MG/DL
PSA SERPL-MCNC: 2.64 NG/ML
RBC # BLD: 5.51 M/UL
SODIUM SERPL-SCNC: 139 MMOL/L
SPECIFIC GRAVITY URINE: 1.01
TRIGL SERPL-MCNC: 248 MG/DL
TSH SERPL-ACNC: 2.07 UIU/ML
UROBILINOGEN URINE: 0.2 MG/DL
VIT B12 SERPL-MCNC: 280 PG/ML

## 2024-09-03 ENCOUNTER — APPOINTMENT (OUTPATIENT)
Facility: CLINIC | Age: 54
End: 2024-09-03
Payer: MEDICAID

## 2024-09-03 VITALS
TEMPERATURE: 97.1 F | SYSTOLIC BLOOD PRESSURE: 111 MMHG | HEART RATE: 55 BPM | BODY MASS INDEX: 32.73 KG/M2 | HEIGHT: 69 IN | WEIGHT: 221 LBS | RESPIRATION RATE: 16 BRPM | DIASTOLIC BLOOD PRESSURE: 74 MMHG | OXYGEN SATURATION: 98 %

## 2024-09-03 DIAGNOSIS — R74.8 ABNORMAL LEVELS OF OTHER SERUM ENZYMES: ICD-10-CM

## 2024-09-03 LAB
IRON SATN MFR SERPL: 36 %
IRON SERPL-MCNC: 124 UG/DL
TIBC SERPL-MCNC: 346 UG/DL
UIBC SERPL-MCNC: 222 UG/DL

## 2024-09-03 PROCEDURE — 99204 OFFICE O/P NEW MOD 45 MIN: CPT

## 2024-09-03 RX ORDER — SODIUM SULFATE, MAGNESIUM SULFATE, AND POTASSIUM CHLORIDE 17.75; 2.7; 2.25 G/1; G/1; G/1
1479-225-188 TABLET ORAL
Qty: 24 | Refills: 0 | Status: ACTIVE | COMMUNITY
Start: 2024-09-03 | End: 1900-01-01

## 2024-09-03 NOTE — ASSESSMENT
[FreeTextEntry1] : Elevated liver chemistries Given obesity, pre-diabetes suspect elevation is secondary to MASLD Fib 4 score: 2.11 Plt: 148 Has been told he has hepatic steatosis on prior imaging Plan: Will pursue chronic liver disease workup including: Hepatitis A, B and C Autoimmune hepatitis: IgG, VIJAYA, ASM-ab Alpha-1 anti-trypsin Ceruloplasmin Iron, TIBC, ferritin  Ab US Ordered for fibroscan  Counseled on the importance of healthier eating habits, weight loss, and exercise Will consider utilizing GLP-1 agonist in future given obesity No statin indicated at this time given low ASCVD risk score  Abdominal bloating/gas Unclear etiology but likely diet related Plan: EGD for further evaluation  Colon cancer screening Average risk Prior colonoscopy: none Alarm symptoms: none Plan: Patient scheduled for colonoscopy Preparation (sutab) sent to pharmacy and explained to patient All questions answered

## 2024-09-03 NOTE — PHYSICAL EXAM
[Alert] : alert [Healthy Appearing] : healthy appearing [Normal] : normal bowel sounds, non-tender, no masses, soft, no no hepato-splenomegaly [Bowel Sounds] : normal bowel sounds [Abdomen Tenderness] : non-tender [No Masses] : no abdominal mass palpated [Abdomen Soft] : soft [Abnormal Walk] : normal gait [Normal Color / Pigmentation] : normal skin color and pigmentation [] : no rash [Motor Exam] : the motor exam was normal [Oriented To Time, Place, And Person] : oriented to person, place, and time [Normal Affect] : the affect was normal [Normal Mood] : the mood was normal

## 2024-09-10 LAB
A1AT SERPL-MCNC: 149 MG/DL
ANA SER IF-ACNC: NEGATIVE
CERULOPLASMIN SERPL-MCNC: 22 MG/DL
HBV CORE IGG+IGM SER QL: NONREACTIVE
HBV SURFACE AB SER QL: NONREACTIVE
HBV SURFACE AG SER QL: NONREACTIVE
HCV AB SER QL: NONREACTIVE
HCV S/CO RATIO: 0.12 S/CO
HEPATITIS A IGG ANTIBODY: NONREACTIVE
IGG SER QL IEP: 1221 MG/DL
MITOCHONDRIA AB SER IF-ACNC: NORMAL
SMOOTH MUSCLE AB SER QL IF: NORMAL

## 2024-09-16 ENCOUNTER — NON-APPOINTMENT (OUTPATIENT)
Age: 54
End: 2024-09-16

## 2024-09-23 ENCOUNTER — APPOINTMENT (OUTPATIENT)
Dept: GASTROENTEROLOGY | Facility: HOSPITAL | Age: 54
End: 2024-09-23

## 2024-09-23 ENCOUNTER — RESULT REVIEW (OUTPATIENT)
Age: 54
End: 2024-09-23

## 2024-09-23 ENCOUNTER — OUTPATIENT (OUTPATIENT)
Dept: OUTPATIENT SERVICES | Facility: HOSPITAL | Age: 54
LOS: 1 days | End: 2024-09-23
Payer: MEDICAID

## 2024-09-23 DIAGNOSIS — R14.0 ABDOMINAL DISTENSION (GASEOUS): ICD-10-CM

## 2024-09-23 DIAGNOSIS — Z12.11 ENCOUNTER FOR SCREENING FOR MALIGNANT NEOPLASM OF COLON: ICD-10-CM

## 2024-09-23 PROCEDURE — 43239 EGD BIOPSY SINGLE/MULTIPLE: CPT

## 2024-09-23 PROCEDURE — 88305 TISSUE EXAM BY PATHOLOGIST: CPT

## 2024-09-23 PROCEDURE — 88312 SPECIAL STAINS GROUP 1: CPT

## 2024-09-23 PROCEDURE — 88305 TISSUE EXAM BY PATHOLOGIST: CPT | Mod: 26

## 2024-09-23 PROCEDURE — 88312 SPECIAL STAINS GROUP 1: CPT | Mod: 26

## 2024-09-23 PROCEDURE — G0121: CPT

## 2024-09-23 PROCEDURE — 45378 DIAGNOSTIC COLONOSCOPY: CPT

## 2024-09-23 RX ORDER — SODIUM CHLORIDE 0.9 % (FLUSH) 0.9 %
500 SYRINGE (ML) INJECTION
Refills: 0 | Status: COMPLETED | OUTPATIENT
Start: 2024-09-23 | End: 2024-09-23

## 2024-09-23 RX ADMIN — Medication 75 MILLILITER(S): at 10:09

## 2024-09-24 LAB — SURGICAL PATHOLOGY STUDY: SIGNIFICANT CHANGE UP

## 2024-09-25 DIAGNOSIS — K29.70 GASTRITIS, UNSPECIFIED, W/OUT BLEEDING: ICD-10-CM

## 2024-09-25 RX ORDER — OMEPRAZOLE 40 MG/1
40 CAPSULE, DELAYED RELEASE ORAL
Qty: 30 | Refills: 1 | Status: ACTIVE | COMMUNITY
Start: 2024-09-25 | End: 1900-01-01

## 2024-10-15 ENCOUNTER — APPOINTMENT (OUTPATIENT)
Facility: CLINIC | Age: 54
End: 2024-10-15

## 2024-11-11 ENCOUNTER — APPOINTMENT (OUTPATIENT)
Dept: ORTHOPEDIC SURGERY | Facility: CLINIC | Age: 54
End: 2024-11-11

## 2025-03-17 NOTE — ED PROVIDER NOTE - SKIN NEGATIVE STATEMENT, MLM
Medication passed protocol.    Disp Refills Start End    levothyroxine 112 MCG tablet 90 tablet 1 8/27/2024 --    Sig - Route: Take 1 tablet by mouth daily.      Last office visit date: 8/27/24    Next appointment scheduled?: Yes   Number of refills given: 90 w/ 1 refill   no abrasions, no jaundice, no lesions, no pruritis, and no rashes.

## 2025-04-08 ENCOUNTER — RX RENEWAL (OUTPATIENT)
Age: 55
End: 2025-04-08

## 2025-06-16 ENCOUNTER — APPOINTMENT (OUTPATIENT)
Dept: RADIOLOGY | Facility: HOSPITAL | Age: 55
End: 2025-06-16

## 2025-07-22 ENCOUNTER — OUTPATIENT (OUTPATIENT)
Dept: OUTPATIENT SERVICES | Facility: HOSPITAL | Age: 55
LOS: 1 days | End: 2025-07-22
Payer: MEDICAID

## 2025-07-22 ENCOUNTER — APPOINTMENT (OUTPATIENT)
Dept: ULTRASOUND IMAGING | Facility: HOSPITAL | Age: 55
End: 2025-07-22
Payer: MEDICAID

## 2025-07-22 DIAGNOSIS — Z00.8 ENCOUNTER FOR OTHER GENERAL EXAMINATION: ICD-10-CM

## 2025-07-22 PROCEDURE — 76770 US EXAM ABDO BACK WALL COMP: CPT | Mod: 26

## 2025-07-22 PROCEDURE — 76770 US EXAM ABDO BACK WALL COMP: CPT

## (undated) DEVICE — KIT ENDO PROCEDURE CUST W/VLV

## (undated) DEVICE — SNARE POLYP SENS SM 13MM 240CM

## (undated) DEVICE — CONTAINER FORMALIN BUFF 10% 60ML

## (undated) DEVICE — ENDOCUFF VISION SZ 2 LG GRN

## (undated) DEVICE — FORCEP RADIAL JAW 4 240CM DISP

## (undated) DEVICE — SOL IRR POUR H2O 1000ML

## (undated) DEVICE — POLY TRAP ETRAP

## (undated) DEVICE — TUBING CAP SET ERBEFLO CLEVERCAP HYBRID CO2 FOR OLYMPUS SCOPES AND UCR

## (undated) DEVICE — PLATE NESSY 170

## (undated) DEVICE — SNARE EXACTO COLD 9MMX230CM

## (undated) DEVICE — Device

## (undated) DEVICE — FORCEP RADIAL JAW 4 W NDL 2.4MM 2.8MM 240CM ORANGE DISP